# Patient Record
Sex: FEMALE | Race: WHITE | Employment: OTHER | URBAN - METROPOLITAN AREA
[De-identification: names, ages, dates, MRNs, and addresses within clinical notes are randomized per-mention and may not be internally consistent; named-entity substitution may affect disease eponyms.]

---

## 2018-09-11 ENCOUNTER — APPOINTMENT (OUTPATIENT)
Dept: GENERAL RADIOLOGY | Age: 68
End: 2018-09-11
Attending: EMERGENCY MEDICINE
Payer: MEDICARE

## 2018-09-11 ENCOUNTER — HOSPITAL ENCOUNTER (EMERGENCY)
Age: 68
Discharge: HOME | End: 2018-09-11
Attending: EMERGENCY MEDICINE
Payer: MEDICARE

## 2018-09-11 VITALS
BODY MASS INDEX: 28.32 KG/M2 | WEIGHT: 170 LBS | HEIGHT: 65 IN | DIASTOLIC BLOOD PRESSURE: 104 MMHG | HEART RATE: 67 BPM | OXYGEN SATURATION: 99 % | TEMPERATURE: 98.1 F | RESPIRATION RATE: 18 BRPM | SYSTOLIC BLOOD PRESSURE: 192 MMHG

## 2018-09-11 DIAGNOSIS — S63.502A WRIST SPRAIN, LEFT, INITIAL ENCOUNTER: Primary | ICD-10-CM

## 2018-09-11 PROCEDURE — 99283 EMERGENCY DEPT VISIT LOW MDM: CPT | Performed by: EMERGENCY MEDICINE

## 2018-09-11 PROCEDURE — 74011250637 HC RX REV CODE- 250/637: Performed by: EMERGENCY MEDICINE

## 2018-09-11 PROCEDURE — 73110 X-RAY EXAM OF WRIST: CPT

## 2018-09-11 PROCEDURE — 75810000053 HC SPLINT APPLICATION: Performed by: EMERGENCY MEDICINE

## 2018-09-11 PROCEDURE — L3908 WHO COCK-UP NONMOLDE PRE OTS: HCPCS

## 2018-09-11 RX ORDER — MORPHINE SULFATE 15 MG/1
15 TABLET ORAL
Status: COMPLETED | OUTPATIENT
Start: 2018-09-11 | End: 2018-09-11

## 2018-09-11 RX ADMIN — MORPHINE SULFATE 15 MG: 15 TABLET ORAL at 17:44

## 2018-09-11 NOTE — DISCHARGE INSTRUCTIONS
Learning About RICE (Rest, Ice, Compression, and Elevation)  What is RICE? RICE is a way to care for an injury. RICE helps relieve pain and swelling. It may also help with healing and flexibility. RICE stands for:  · Rest and protect the injured or sore area. · Ice or a cold pack used as soon as possible. · Compression, or wrapping the injured or sore area with an elastic bandage. · Elevation (propping up) the injured or sore area. How do you do RICE? You can use RICE for home treatment when you have general aches and pains or after an injury or surgery. Rest  · Do not put weight on the injury for at least 24 to 48 hours. · Use crutches for a badly sprained knee or ankle. · Support a sprained wrist, elbow, or shoulder with a sling. Ice  · Put ice or a cold pack on the injury right away to reduce pain and swelling. Frozen vegetables will also work as an ice pack. Put a thin cloth between the ice or cold pack and your skin. The cloth protects the injured area from getting too cold. · Use ice for 10 to 15 minutes at a time for the first 48 to 72 hours. Compression  · Use compression for sprains, strains, and surgeries of the arms and legs. · Wrap the injured area with an elastic bandage or compression sleeve to reduce swelling. · Don't wrap it too tightly. If the area below it feels numb, tingles, or feels cool, loosen the wrap. Elevation  · Use elevation for areas of the body that can be propped up, such as arms and legs. · Prop up the injured area on pillows whenever you use ice. Keep it propped up anytime you sit or lie down. · Try to keep the injured area at or above the level of your heart. This will help reduce swelling and bruising. Where can you learn more? Go to http://marianela-kana.info/. Enter W362 in the search box to learn more about \"Learning About RICE (Rest, Ice, Compression, and Elevation). \"  Current as of: November 29, 2017  Content Version: 11.7  © 9952-9372 [employer]. Care instructions adapted under license by [employer] (which disclaims liability or warranty for this information). If you have questions about a medical condition or this instruction, always ask your healthcare professional. [address] any warranty or liability for your use of this information. Strain or Sprain: Care Instructions  Your Care Instructions    A strain happens when you overstretch, or pull, a muscle. A sprain occurs when you stretch or tear a ligament, the tough tissue that connects one bone to another. These problems can happen when you exercise or lift something or when you are in an accident. Rest and other home care can help strains and sprains heal.  The doctor has checked you carefully, but problems can develop later. If you notice any problems or new symptoms,  get medical treatment right away. Follow-up care is a key part of your treatment and safety. Be sure to make and go to all appointments, and call your doctor if you are having problems. It's also a good idea to know your test results and keep a list of the medicines you take. How can you care for yourself at home? · If your doctor gave you a sling, splint, brace, or immobilizer, use it exactly as directed. · Rest the strained or sprained area, and follow your doctor's advice about when you can be active again. · Put ice or a cold pack on the sore area for 10 to 20 minutes at a time to stop swelling. Try this every 1 to 2 hours for 3 days (when you are awake) or until the swelling goes down. Put a thin cloth between the ice pack and your skin. Keep your splint or brace dry. · Prop up a sore arm or leg on a pillow when you ice it or anytime you sit or lie down. Try to keep it higher than the level of your heart. This will help reduce swelling. · Take pain medicines exactly as directed.   ¨ If the doctor gave you a prescription medicine for pain, take it as prescribed. ¨ If you are not taking a prescription pain medicine, ask your doctor if you can take an over-the-counter medicine. · Do exercises as directed by your doctor or physical therapist.  · Return to your usual level of activity slowly. · Do not do anything that makes the pain worse. When should you call for help? Call your doctor now or seek immediate medical care if:    · You have severe or increasing pain.     · You have tingling, weakness, or numbness in the area.     · The area turns cold or changes color.     · Your cast or splint feels too tight.     · You have symptoms of a blood clot, such as:  ¨ Pain in your calf, back of the knee, thigh, or groin. ¨ Redness and swelling in your leg or groin.     · You cannot move the strained part of your body.    Watch closely for changes in your health, and be sure to contact your doctor if:    · You do not get better as expected. Where can you learn more? Go to http://marianela-kana.info/. Enter Z900 in the search box to learn more about \"Strain or Sprain: Care Instructions. \"  Current as of: November 29, 2017  Content Version: 11.7  © 0452-5933 [employer]. Care instructions adapted under license by [employer] (which disclaims liability or warranty for this information). If you have questions about a medical condition or this instruction, always ask your healthcare professional. [place] [address] any warranty or liability for your use of this information.

## 2018-09-11 NOTE — ED TRIAGE NOTES
Pt was at home today, lives with family. No family was at home for a short time and when they came back, pt's right arm was causing excruciating pain. Pt can lift and move arm but causes terrible pain. ams baseline. Does not know if she fell. Family denies any medical hx

## 2018-09-11 NOTE — ED NOTES
I have reviewed discharge instructions with the patient and caregiver. The patient and caregiver verbalized understanding. Patient left ED via Discharge Method: ambulatory to Home with family Opportunity for questions and clarification provided. Patient given 0 scripts. To continue your aftercare when you leave the hospital, you may receive an automated call from our care team to check in on how you are doing. This is a free service and part of our promise to provide the best care and service to meet your aftercare needs.  If you have questions, or wish to unsubscribe from this service please call [phone]. Thank you for Choosing our [facility] Emergency Department.

## 2018-09-11 NOTE — ED PROVIDER NOTES
[address] Emergency Department Seen  @ [facility] EMERGENCY DEPT in room ERK/K Chief Complaint Patient presents with  Wrist Pain HPI:  
Vicki De Jesus is a 77 y.o. female who complaints of right wrist pain. Patient was at home alone briefly while the family went out. When he returned she was sitting up in a chair. He denies any fall. Complains of pain in the right wrist.  Mild swelling. May be some deformity. Pain with range of motion. Historian: patient and family Review of Systems: No fever chills chest pain or shortness of breath Past Medical History: 
Primary Care Doctor: None Past Medical History:  
Diagnosis Date  Neurological disorder Seizures No past surgical history on file. Social History Social History  Marital status:  Spouse name: N/A  
 Number of children: N/A  
 Years of education: N/A Social History Main Topics  Smoking status: Never Smoker  Smokeless tobacco: Not on file  Alcohol use Not on file  Drug use: Not on file  Sexual activity: Not on file Other Topics Concern  Not on file Social History Narrative Previous Medications DIVALPROEX DR (DEPAKOTE) 250 MG TABLET    Take 250 mg by mouth three (3) times daily. HYDROCODONE-ACETAMINOPHEN (NORCO) 5-325 MG PER TABLET    Take 1 Tab by mouth every four (4) hours as needed for Pain. Max Daily Amount: 6 Tabs. ONDANSETRON (ZOFRAN ODT) 4 MG DISINTEGRATING TABLET    Take 1 Tab by mouth every eight (8) hours as needed for Nausea. Allergies Allergen Reactions  Amoxicillin Rash Physical Exam:   
Vitals:  
 09/11/18 1653 BP: (!) 192/104 Pulse: 67 Resp: 18 Temp: 98.1 °F (36.7 °C) SpO2: 99% Vital signs were reviewed. Pulse oximetry interpretation: normal 
 
Constitutional: Elderly nontoxic Head: Atraumatic, normo-cephalic,  
Ears/Nose/Throat: throat clear, mucous membranes moist, Eyes: PERRL, EOMI, anicteric, Cardiovascular: regular rate and rhythm, no murmur, 2+ radial pulses, Respiratory: clear to auscultation with no wheezes, rales, ronchi, Musculoskeletal: Right wrist is tender pain with movement Skin: dry, no rashes, Neurologic: alert, oriented, answers questions follows commands,   
 
_____________________________________________________________________ Medical Decision Making: This is a new problem that does need additional workup Labs/Radiographs/ECG were ordered: yes 
 
 
______________________________________________________________________ 
ED Evaluation Radiology studies performed: XR WRIST RT AP/LAT/OBL MIN 3V    (Results Pending) Radiographs were visualized by me No current facility-administered medications for this encounter. Current Outpatient Prescriptions Medication Sig  
 HYDROcodone-acetaminophen (NORCO) 5-325 mg per tablet Take 1 Tab by mouth every four (4) hours as needed for Pain. Max Daily Amount: 6 Tabs.  ondansetron (ZOFRAN ODT) 4 mg disintegrating tablet Take 1 Tab by mouth every eight (8) hours as needed for Nausea.  divalproex DR (DEPAKOTE) 250 mg tablet Take 250 mg by mouth three (3) times daily.  
 
 
================================================================== 
ASSESSMENT: 78-year-old female with right wrist pain. Questionable fall. We'll give her some pain medicine today. Didn't x-ray. Reassess PLAN: likely splint and discharge home Yuki Aleman MD; 9/11/2018 @5:03 PM=========================================== 
 
ED Course

## 2020-11-25 ENCOUNTER — PATIENT OUTREACH (OUTPATIENT)
Dept: CASE MANAGEMENT | Age: 70
End: 2020-11-25

## 2020-11-25 NOTE — PROGRESS NOTES
ROSIE DAVIS tried to call and touch base with pt/family. However, ROSIE DAVIS got their voice mail and had to leave a message. PLAN:  ROSIE DAVIS will attempt to reach pt/family again on Friday if ROSIE DAVIS doesn't hear from them before then.

## 2020-11-27 ENCOUNTER — PATIENT OUTREACH (OUTPATIENT)
Dept: CASE MANAGEMENT | Age: 70
End: 2020-11-27

## 2020-11-27 NOTE — Clinical Note
Good Afternoon,    I just wanted to let you know that I've touched base with this patients son. He stated that for right now he wants to put any help on hold until he's able to talk with all of his sisters about the pateints care needs. I made sure that he knows how to get in touch with me, but if I don't hear from him in three weeks then I'll plan to close this CCM episode. I will let you know in three weeks if I've heard from him.     [address] [facility]

## 2020-11-27 NOTE — PROGRESS NOTES
ROSIE DAVIS called and spoke with pt's son who is an authorized person per her ODALYS on file. Pt's son said that he still needs to talk with his sisters before they make any major decisions about pt's care needs. Pt's son said that for now he'll take ROSIE DAVIS's info and call ROSIE DAVIS back if he feels like they need anything.     PLAN:  ROSIE DAVIS will remain available over the next few weeks but if ROSIE DAVIS doesn't hear back from pt's son ROSIE DAVIS will move to close CCM episode and remove herself from pt's care team.      ROSIE DAVIS will notify provider of pt's families wishes

## 2020-12-14 ENCOUNTER — APPOINTMENT (OUTPATIENT)
Dept: CT IMAGING | Age: 70
DRG: 177 | End: 2020-12-14
Attending: EMERGENCY MEDICINE
Payer: MEDICARE

## 2020-12-14 ENCOUNTER — HOSPITAL ENCOUNTER (INPATIENT)
Age: 70
LOS: 8 days | Discharge: HOME | DRG: 177 | End: 2020-12-22
Attending: EMERGENCY MEDICINE | Admitting: INTERNAL MEDICINE
Payer: MEDICARE

## 2020-12-14 ENCOUNTER — APPOINTMENT (OUTPATIENT)
Dept: GENERAL RADIOLOGY | Age: 70
DRG: 177 | End: 2020-12-14
Attending: EMERGENCY MEDICINE
Payer: MEDICARE

## 2020-12-14 DIAGNOSIS — R41.0 DELIRIUM: Primary | ICD-10-CM

## 2020-12-14 DIAGNOSIS — N39.0 URINARY TRACT INFECTION WITHOUT HEMATURIA, SITE UNSPECIFIED: ICD-10-CM

## 2020-12-14 DIAGNOSIS — R91.8 BILATERAL PULMONARY INFILTRATES ON CHEST X-RAY: ICD-10-CM

## 2020-12-14 PROBLEM — G93.41 ENCEPHALOPATHY, METABOLIC: Status: ACTIVE | Noted: 2020-12-14

## 2020-12-14 PROBLEM — G40.909 SEIZURE DISORDER (HCC): Status: ACTIVE | Noted: 2020-12-14

## 2020-12-14 PROBLEM — Z20.822 EXPOSURE TO COVID-19 VIRUS: Status: ACTIVE | Noted: 2020-12-14

## 2020-12-14 PROBLEM — F03.90 DEMENTIA (HCC): Status: ACTIVE | Noted: 2020-12-14

## 2020-12-14 PROBLEM — R41.82 ALTERED MENTAL STATUS: Status: ACTIVE | Noted: 2020-12-14

## 2020-12-14 LAB
ALBUMIN SERPL-MCNC: 2.9 G/DL (ref 3.2–4.6)
ALBUMIN/GLOB SERPL: 0.6 {RATIO} (ref 1.2–3.5)
ALP SERPL-CCNC: 90 U/L (ref 50–136)
ALT SERPL-CCNC: 16 U/L (ref 12–65)
ANION GAP SERPL CALC-SCNC: 7 MMOL/L (ref 7–16)
APPEARANCE UR: ABNORMAL
AST SERPL-CCNC: 35 U/L (ref 15–37)
ATRIAL RATE: 93 BPM
BACTERIA URNS QL MICRO: 0 /HPF
BASOPHILS # BLD: 0.1 K/UL (ref 0–0.2)
BASOPHILS NFR BLD: 1 % (ref 0–2)
BILIRUB SERPL-MCNC: 0.6 MG/DL (ref 0.2–1.1)
BILIRUB UR QL: ABNORMAL
BUN SERPL-MCNC: 13 MG/DL (ref 8–23)
CALCIUM SERPL-MCNC: 9.2 MG/DL (ref 8.3–10.4)
CALCULATED P AXIS, ECG09: 71 DEGREES
CALCULATED R AXIS, ECG10: 59 DEGREES
CALCULATED T AXIS, ECG11: -36 DEGREES
CASTS URNS QL MICRO: ABNORMAL /LPF
CHLORIDE SERPL-SCNC: 102 MMOL/L (ref 98–107)
CO2 SERPL-SCNC: 29 MMOL/L (ref 21–32)
COLOR UR: ABNORMAL
COVID-19 RAPID TEST, COVR: DETECTED
CREAT SERPL-MCNC: 0.63 MG/DL (ref 0.6–1)
DIAGNOSIS, 93000: NORMAL
DIFFERENTIAL METHOD BLD: ABNORMAL
EOSINOPHIL # BLD: 0.1 K/UL (ref 0–0.8)
EOSINOPHIL NFR BLD: 1 % (ref 0.5–7.8)
EPI CELLS #/AREA URNS HPF: ABNORMAL /HPF
ERYTHROCYTE [DISTWIDTH] IN BLOOD BY AUTOMATED COUNT: 13 % (ref 11.9–14.6)
GLOBULIN SER CALC-MCNC: 4.7 G/DL (ref 2.3–3.5)
GLUCOSE SERPL-MCNC: 90 MG/DL (ref 65–100)
GLUCOSE UR STRIP.AUTO-MCNC: NEGATIVE MG/DL
HCT VFR BLD AUTO: 40.4 % (ref 35.8–46.3)
HGB BLD-MCNC: 13 G/DL (ref 11.7–15.4)
HGB UR QL STRIP: ABNORMAL
IMM GRANULOCYTES # BLD AUTO: 0.1 K/UL (ref 0–0.5)
IMM GRANULOCYTES NFR BLD AUTO: 1 % (ref 0–5)
KETONES UR QL STRIP.AUTO: 15 MG/DL
LACTATE SERPL-SCNC: 1.7 MMOL/L (ref 0.4–2)
LEUKOCYTE ESTERASE UR QL STRIP.AUTO: ABNORMAL
LYMPHOCYTES # BLD: 2.1 K/UL (ref 0.5–4.6)
LYMPHOCYTES NFR BLD: 24 % (ref 13–44)
MCH RBC QN AUTO: 29 PG (ref 26.1–32.9)
MCHC RBC AUTO-ENTMCNC: 32.2 G/DL (ref 31.4–35)
MCV RBC AUTO: 90 FL (ref 79.6–97.8)
MONOCYTES # BLD: 0.7 K/UL (ref 0.1–1.3)
MONOCYTES NFR BLD: 8 % (ref 4–12)
NEUTS SEG # BLD: 5.8 K/UL (ref 1.7–8.2)
NEUTS SEG NFR BLD: 66 % (ref 43–78)
NITRITE UR QL STRIP.AUTO: NEGATIVE
NRBC # BLD: 0 K/UL (ref 0–0.2)
P-R INTERVAL, ECG05: 116 MS
PH UR STRIP: 6 [PH] (ref 5–9)
PLATELET # BLD AUTO: 368 K/UL (ref 150–450)
PMV BLD AUTO: 9.1 FL (ref 9.4–12.3)
POTASSIUM SERPL-SCNC: 3.5 MMOL/L (ref 3.5–5.1)
PROCALCITONIN SERPL-MCNC: <0.05 NG/ML
PROT SERPL-MCNC: 7.6 G/DL (ref 6.3–8.2)
PROT UR STRIP-MCNC: 30 MG/DL
Q-T INTERVAL, ECG07: 332 MS
QRS DURATION, ECG06: 88 MS
QTC CALCULATION (BEZET), ECG08: 412 MS
RBC # BLD AUTO: 4.49 M/UL (ref 4.05–5.2)
RBC #/AREA URNS HPF: ABNORMAL /HPF
SODIUM SERPL-SCNC: 138 MMOL/L (ref 136–145)
SOURCE, COVRS: ABNORMAL
SP GR UR REFRACTOMETRY: 1.02 (ref 1–1.02)
UROBILINOGEN UR QL STRIP.AUTO: 1 EU/DL (ref 0.2–1)
VALPROATE SERPL-MCNC: 64 UG/ML (ref 50–100)
VENTRICULAR RATE, ECG03: 93 BPM
WBC # BLD AUTO: 8.7 K/UL (ref 4.3–11.1)
WBC URNS QL MICRO: >100 /HPF

## 2020-12-14 PROCEDURE — 93005 ELECTROCARDIOGRAM TRACING: CPT | Performed by: EMERGENCY MEDICINE

## 2020-12-14 PROCEDURE — 74011250637 HC RX REV CODE- 250/637: Performed by: INTERNAL MEDICINE

## 2020-12-14 PROCEDURE — 87088 URINE BACTERIA CULTURE: CPT

## 2020-12-14 PROCEDURE — 74011250636 HC RX REV CODE- 250/636: Performed by: EMERGENCY MEDICINE

## 2020-12-14 PROCEDURE — 83605 ASSAY OF LACTIC ACID: CPT

## 2020-12-14 PROCEDURE — 71045 X-RAY EXAM CHEST 1 VIEW: CPT

## 2020-12-14 PROCEDURE — 80053 COMPREHEN METABOLIC PANEL: CPT

## 2020-12-14 PROCEDURE — 96365 THER/PROPH/DIAG IV INF INIT: CPT

## 2020-12-14 PROCEDURE — 87040 BLOOD CULTURE FOR BACTERIA: CPT

## 2020-12-14 PROCEDURE — 85025 COMPLETE CBC W/AUTO DIFF WBC: CPT

## 2020-12-14 PROCEDURE — 87635 SARS-COV-2 COVID-19 AMP PRB: CPT

## 2020-12-14 PROCEDURE — 87186 SC STD MICRODIL/AGAR DIL: CPT

## 2020-12-14 PROCEDURE — 80164 ASSAY DIPROPYLACETIC ACD TOT: CPT

## 2020-12-14 PROCEDURE — 84145 PROCALCITONIN (PCT): CPT

## 2020-12-14 PROCEDURE — 99284 EMERGENCY DEPT VISIT MOD MDM: CPT

## 2020-12-14 PROCEDURE — 65270000029 HC RM PRIVATE

## 2020-12-14 PROCEDURE — 74011000258 HC RX REV CODE- 258: Performed by: EMERGENCY MEDICINE

## 2020-12-14 PROCEDURE — 81001 URINALYSIS AUTO W/SCOPE: CPT

## 2020-12-14 PROCEDURE — 74011250636 HC RX REV CODE- 250/636: Performed by: INTERNAL MEDICINE

## 2020-12-14 PROCEDURE — 87086 URINE CULTURE/COLONY COUNT: CPT

## 2020-12-14 PROCEDURE — 70450 CT HEAD/BRAIN W/O DYE: CPT

## 2020-12-14 RX ORDER — ACETAMINOPHEN 650 MG/1
650 SUPPOSITORY RECTAL
Status: DISCONTINUED | OUTPATIENT
Start: 2020-12-14 | End: 2020-12-22 | Stop reason: HOSPADM

## 2020-12-14 RX ORDER — POLYETHYLENE GLYCOL 3350 17 G/17G
17 POWDER, FOR SOLUTION ORAL DAILY PRN
Status: DISCONTINUED | OUTPATIENT
Start: 2020-12-14 | End: 2020-12-22 | Stop reason: HOSPADM

## 2020-12-14 RX ORDER — SODIUM CHLORIDE 9 MG/ML
50 INJECTION, SOLUTION INTRAVENOUS CONTINUOUS
Status: DISPENSED | OUTPATIENT
Start: 2020-12-14 | End: 2020-12-15

## 2020-12-14 RX ORDER — SODIUM CHLORIDE 0.9 % (FLUSH) 0.9 %
5-40 SYRINGE (ML) INJECTION AS NEEDED
Status: DISCONTINUED | OUTPATIENT
Start: 2020-12-14 | End: 2020-12-22 | Stop reason: HOSPADM

## 2020-12-14 RX ORDER — ENOXAPARIN SODIUM 100 MG/ML
40 INJECTION SUBCUTANEOUS DAILY
Status: DISCONTINUED | OUTPATIENT
Start: 2020-12-15 | End: 2020-12-15

## 2020-12-14 RX ORDER — ACETAMINOPHEN 325 MG/1
650 TABLET ORAL
Status: DISCONTINUED | OUTPATIENT
Start: 2020-12-14 | End: 2020-12-22 | Stop reason: HOSPADM

## 2020-12-14 RX ORDER — DIVALPROEX SODIUM 125 MG/1
250 CAPSULE, COATED PELLETS ORAL 3 TIMES DAILY
Status: DISCONTINUED | OUTPATIENT
Start: 2020-12-14 | End: 2020-12-22 | Stop reason: HOSPADM

## 2020-12-14 RX ORDER — ONDANSETRON 2 MG/ML
4 INJECTION INTRAMUSCULAR; INTRAVENOUS
Status: DISCONTINUED | OUTPATIENT
Start: 2020-12-14 | End: 2020-12-22 | Stop reason: HOSPADM

## 2020-12-14 RX ORDER — SODIUM CHLORIDE 0.9 % (FLUSH) 0.9 %
5-40 SYRINGE (ML) INJECTION EVERY 8 HOURS
Status: DISCONTINUED | OUTPATIENT
Start: 2020-12-14 | End: 2020-12-22 | Stop reason: HOSPADM

## 2020-12-14 RX ORDER — PROMETHAZINE HYDROCHLORIDE 25 MG/1
12.5 TABLET ORAL
Status: DISCONTINUED | OUTPATIENT
Start: 2020-12-14 | End: 2020-12-22 | Stop reason: HOSPADM

## 2020-12-14 RX ADMIN — CEFTRIAXONE SODIUM 1 G: 1 INJECTION, POWDER, FOR SOLUTION INTRAMUSCULAR; INTRAVENOUS at 15:31

## 2020-12-14 RX ADMIN — SODIUM CHLORIDE 1000 ML: 900 INJECTION, SOLUTION INTRAVENOUS at 13:42

## 2020-12-14 RX ADMIN — SODIUM CHLORIDE 50 ML/HR: 900 INJECTION, SOLUTION INTRAVENOUS at 19:03

## 2020-12-14 RX ADMIN — DIVALPROEX SODIUM 250 MG: 125 CAPSULE ORAL at 22:21

## 2020-12-14 NOTE — ED PROVIDER NOTES
70-year-old female was brought in by EMS. Due to dementia and altered mental status, she cannot give any history at all. Son gives history. Patient has a history of seizure disorder takes Depakote. Also history of occasional UTIs as well. She did have some UTI symptoms and was started on Cipro for evidently for UTI about 3 days ago. Son states over the last 24 hours she has had decreased activity with decreased mental status. Some tachypnea and apparent shortness of breath as well. She has had a slight cough but decreased p.o. intake. Her baseline is such that she usually walks with assist and can occasionally carry on simple conversation. However the last 1 to 2days she has not gotten up at all and has eaten very little is really not spoken much at all in the last 2 days. States no history of cardiac issues, lung issues or CVA. Some falls. The history is provided by a caregiver and the EMS personnel. Fatigue   This is a new problem. The current episode started more than 2 days ago. The problem has been rapidly worsening. There was no focality noted. Primary symptoms include slurred speech, mental status change and unresponsiveness. There has been no fever. Associated symptoms include shortness of breath, altered mental status and confusion. Pertinent negatives include no chest pain and no vomiting. Associated medical issues include dementia.         Past Medical History:   Diagnosis Date    Neurological disorder     Seizures       Past Surgical History:   Procedure Laterality Date    HX HYSTERECTOMY           Family History:   Problem Relation Age of Onset    No Known Problems Mother     No Known Problems Father     No Known Problems Maternal Grandmother     No Known Problems Maternal Grandfather     No Known Problems Paternal Grandmother     No Known Problems Paternal Grandfather        Social History     Socioeconomic History    Marital status:      Spouse name: Not on file    Number of children: Not on file    Years of education: Not on file    Highest education level: Not on file   Occupational History    Not on file   Social Needs    Financial resource strain: Not on file    Food insecurity     Worry: Not on file     Inability: Not on file    Transportation needs     Medical: Not on file     Non-medical: Not on file   Tobacco Use    Smoking status: Never Smoker    Smokeless tobacco: Never Used   Substance and Sexual Activity    Alcohol use: Not Currently    Drug use: Never    Sexual activity: Not Currently     Partners: Male   Lifestyle    Physical activity     Days per week: Not on file     Minutes per session: Not on file    Stress: Not on file   Relationships    Social connections     Talks on phone: Not on file     Gets together: Not on file     Attends Jewish service: Not on file     Active member of club or organization: Not on file     Attends meetings of clubs or organizations: Not on file     Relationship status: Not on file    Intimate partner violence     Fear of current or ex partner: Not on file     Emotionally abused: Not on file     Physically abused: Not on file     Forced sexual activity: Not on file   Other Topics Concern    Not on file   Social History Narrative    Not on file         ALLERGIES: Amoxicillin    Review of Systems   Unable to perform ROS: Dementia   Constitutional: Positive for fatigue. Respiratory: Positive for shortness of breath. Cardiovascular: Negative for chest pain. Gastrointestinal: Negative for vomiting. Psychiatric/Behavioral: Positive for confusion. Vitals:    12/14/20 1212   BP: 125/83   Pulse: (!) 107   Resp: 26   Temp: 98 °F (36.7 °C)   SpO2: 96%   Weight: 65.3 kg (144 lb)   Height: 5' 5\" (1.651 m)            Physical Exam  Vitals signs and nursing note reviewed. Constitutional:       Appearance: She is well-developed. She is ill-appearing. HENT:      Head: Normocephalic and atraumatic.       Right Ear: External ear normal.      Left Ear: External ear normal.      Mouth/Throat:      Pharynx: No oropharyngeal exudate. Eyes:      Conjunctiva/sclera: Conjunctivae normal.      Pupils: Pupils are equal, round, and reactive to light. Neck:      Musculoskeletal: Normal range of motion and neck supple. Cardiovascular:      Rate and Rhythm: Normal rate and regular rhythm. Heart sounds: No murmur. Pulmonary:      Effort: Tachypnea present. No accessory muscle usage or respiratory distress. Breath sounds: Normal breath sounds. Abdominal:      General: Bowel sounds are normal.      Palpations: Abdomen is soft. There is no mass. Tenderness: There is no abdominal tenderness. There is no guarding or rebound. Hernia: No hernia is present. Skin:     General: Skin is warm and dry. Neurological:      Mental Status: She is oriented to person, place, and time. She is lethargic. GCS: GCS eye subscore is 2. GCS verbal subscore is 2. GCS motor subscore is 5. Gait: Gait normal.      Comments: Nl speech  Seems to have equal strength in upper extremities regarding  and pulling. No obvious facial asymmetry. Moves both lower extremities. Does not follow commands. Psychiatric:         Speech: Speech normal.          MDM  Number of Diagnoses or Management Options  Diagnosis management comments: Patient with delirium. Assess for dehydration, sepsis, pneumonia, UTI. Check for electrolyte abnormalities. Head CT to assess for any intracranial injuries. Check EKG.        Amount and/or Complexity of Data Reviewed  Clinical lab tests: reviewed and ordered  Tests in the radiology section of CPT®: ordered and reviewed  Tests in the medicine section of CPT®: ordered and reviewed  Review and summarize past medical records: yes (See HPI)  Discuss the patient with other providers: yes  Independent visualization of images, tracings, or specimens: yes    Risk of Complications, Morbidity, and/or Mortality  Presenting problems: moderate  Diagnostic procedures: low  Management options: moderate    Patient Progress  Patient progress: stable         Procedures      Results Include:    Recent Results (from the past 24 hour(s))   CBC WITH AUTOMATED DIFF    Collection Time: 12/14/20 12:14 PM   Result Value Ref Range    WBC 8.7 4.3 - 11.1 K/uL    RBC 4.49 4.05 - 5.2 M/uL    HGB 13.0 11.7 - 15.4 g/dL    HCT 40.4 35.8 - 46.3 %    MCV 90.0 79.6 - 97.8 FL    MCH 29.0 26.1 - 32.9 PG    MCHC 32.2 31.4 - 35.0 g/dL    RDW 13.0 11.9 - 14.6 %    PLATELET 022 913 - 723 K/uL    MPV 9.1 (L) 9.4 - 12.3 FL    ABSOLUTE NRBC 0.00 0.0 - 0.2 K/uL    DF AUTOMATED      NEUTROPHILS 66 43 - 78 %    LYMPHOCYTES 24 13 - 44 %    MONOCYTES 8 4.0 - 12.0 %    EOSINOPHILS 1 0.5 - 7.8 %    BASOPHILS 1 0.0 - 2.0 %    IMMATURE GRANULOCYTES 1 0.0 - 5.0 %    ABS. NEUTROPHILS 5.8 1.7 - 8.2 K/UL    ABS. LYMPHOCYTES 2.1 0.5 - 4.6 K/UL    ABS. MONOCYTES 0.7 0.1 - 1.3 K/UL    ABS. EOSINOPHILS 0.1 0.0 - 0.8 K/UL    ABS. BASOPHILS 0.1 0.0 - 0.2 K/UL    ABS. IMM. GRANS. 0.1 0.0 - 0.5 K/UL   METABOLIC PANEL, COMPREHENSIVE    Collection Time: 12/14/20 12:14 PM   Result Value Ref Range    Sodium 138 136 - 145 mmol/L    Potassium 3.5 3.5 - 5.1 mmol/L    Chloride 102 98 - 107 mmol/L    CO2 29 21 - 32 mmol/L    Anion gap 7 7 - 16 mmol/L    Glucose 90 65 - 100 mg/dL    BUN 13 8 - 23 MG/DL    Creatinine 0.63 0.6 - 1.0 MG/DL    GFR est AA >60 >60 ml/min/1.73m2    GFR est non-AA >60 >60 ml/min/1.73m2    Calcium 9.2 8.3 - 10.4 MG/DL    Bilirubin, total 0.6 0.2 - 1.1 MG/DL    ALT (SGPT) 16 12 - 65 U/L    AST (SGOT) 35 15 - 37 U/L    Alk.  phosphatase 90 50 - 136 U/L    Protein, total 7.6 6.3 - 8.2 g/dL    Albumin 2.9 (L) 3.2 - 4.6 g/dL    Globulin 4.7 (H) 2.3 - 3.5 g/dL    A-G Ratio 0.6 (L) 1.2 - 3.5     LACTIC ACID    Collection Time: 12/14/20 12:14 PM   Result Value Ref Range    Lactic acid 1.7 0.4 - 2.0 MMOL/L   VALPROIC ACID    Collection Time: 12/14/20 12:14 PM Result Value Ref Range    Valproic acid 64 50 - 100 ug/ml   PROCALCITONIN    Collection Time: 12/14/20 12:35 PM   Result Value Ref Range    Procalcitonin <0.05 ng/mL   URINALYSIS W/ RFLX MICROSCOPIC    Collection Time: 12/14/20  1:52 PM   Result Value Ref Range    Color ELBA      Appearance CLOUDY      Specific gravity 1.024 (H) 1.001 - 1.023      pH (UA) 6.0 5.0 - 9.0      Protein 30 (A) NEG mg/dL    Glucose Negative mg/dL    Ketone 15 (A) NEG mg/dL    Bilirubin SMALL (A) NEG      Blood TRACE (A) NEG      Urobilinogen 1.0 0.2 - 1.0 EU/dL    Nitrites Negative NEG      Leukocyte Esterase LARGE (A) NEG      WBC >100 (H) 0 /hpf    RBC 0-3 0 /hpf    Epithelial cells 0-3 0 /hpf    Bacteria 0 0 /hpf    Casts 10-20 0 /lpf     Ct Head Wo Cont    Result Date: 12/14/2020  Title:  CT Head without contrast. Clinical History: The Female patient is 65years old  presenting with symptoms of unResponsive. Technique: Thin slice axial CT images through the brain were obtained. All CT scans at this facility are performed using dose reduction/dose modulation techniques, as appropriate the performed exam, including the following: Automated Exposure Control; Adjustment of the mA and/or kV according to patient size (this includes techniques or standardized protocols for targeted exams where dose is matched to indication/reason for exam); and Use of Iterative Reconstruction Technique. Radiation Exposure Indices: Reference Air Kerma (Ka,r) = 567 mGy-cm Comparison:  None. Findings:  Cerebrum: Age-related cortical involutional changes are seen with sulcal and ventricular prominence. There is normal gray-white matter differentiation. No evidence of intracranial hemorrhage, mass, or other space-occupying lesion is seen. There are no abnormal extra-axial fluid collections. Cerebellum: Involutional changes. CSF spaces: The ventricular system is within normal limits. The basilar cisterns are unremarkable.  Brainstem: No evidence of ischemia, hemorrhage, or mass. Extracranial tissues: Visualized orbits and extracranial soft tissues are unremarkable. Paranasal sinuses/Mastoids: Well-pneumatized and aerated. . Calvarium: Incidental hyperostosis frontalis interna. IMPRESSION: 1. No acute intracranial abnormality. CPT code 84651     Xr Chest Port    Result Date: 12/14/2020  Clinical History: The female patient is a 65year old  presenting with symptoms of cough. Comparison:  none Findings:  Frontal view of the chest was obtained. There is minimal platelike atelectasis in the right midlung field with questionable slight infiltrate at the lung bases, right greater than left. No pleural effusions are demonstrated. The cardiomediastinal silhouette is within normal limits. There are no acute osseous abnormalities. Impression:  1. Questionable minimal infiltrate at lung bases, right greater than left with slight platelike atelectasis in the peripheral right midlung field. CPT code(s) 16238     3:50 PM  Tachypnea has improved. Discussed with son patient still has resistant UTI and bibasilar infiltrates. Will discuss with hospitalist regarding admission. Will need to be swabbed for Covid.

## 2020-12-14 NOTE — ED NOTES
Pt arrives via EMS from home, lives in a camper in her sons backyard. EMS reports increased urinary frequency for a couple of days. Pt does suffer from dementia, no family currently with patient. Nonverbal in triage NAD. Masked.

## 2020-12-14 NOTE — ED TRIAGE NOTES
Pt arrives via EMS from home, lives in a camper in her sons backyard. States increased urinary frequency and fatigue for a couple of days. Pt does suffer from dementia, no family currently with patient. Alert and oriented to self and place. NAD. Masked.      Denies sick contacts

## 2020-12-14 NOTE — H&P
Hospitalist H&P Note     Admit Date:  2020  1:24 PM   Name:  Raymond Ma  Age: 65 y.o.  : 1950   MRN:  [id number]   PCP:  Trupti Davila PA-C    HPI/Subjective:   Raymond Ma is a 65 y.o. female with medical history significant for dementia, seizures who was brought in by her son due to decreased mental status labored breathing and decreased p.o. intake. Patient is somnolent and is unable to answer any questions. Her son who is at bedside provided the medical history. Patient normally is ANO x1-2 and able to engage in simple conversation. She is able to walk with assistance. However, in the past 3 days patient has become less active and lethargic with decreased p.o. intake. Per son, this is how she normally gets when she gets UTI. Patient was started on ciprofloxacin when she got 2 doses so far. Patient condition worsened and she has not been able to get up or speak much in the past 2 days. He also noticed that patient was tachypneic and labored breathing over the weekend which got worse last night. Of note patient has a family member who tested positive for Covid last Monday. She does not have any known fevers, chills, nausea, vomiting, abdominal pain or diarrhea. In the ED, patient is afebrile and hemodynamically stable with saturation above 94% on room air. Blood work work-up is unrevealing. Urine analysis shows cloudy urine with large leukocyte esterase and more than 100 WBC. CXR with minimal infiltrate at lung bases, right greater than left. Procalc of < 0.05. CT head without any acute intracranial abnormalities. ROS unable to be obtained due to patient's clinical status.       Past Medical History:   Diagnosis Date    Neurological disorder     Seizures      Past Surgical History:   Procedure Laterality Date    HX HYSTERECTOMY        Allergies   Allergen Reactions    Amoxicillin Rash      Social History     Tobacco Use    Smoking status: Never Smoker    Smokeless tobacco: Never Used   Substance Use Topics    Alcohol use: Not Currently      Family History   Problem Relation Age of Onset    No Known Problems Mother     No Known Problems Father     No Known Problems Maternal Grandmother     No Known Problems Maternal Grandfather     No Known Problems Paternal Grandmother     No Known Problems Paternal Grandfather       Immunization History   Administered Date(s) Administered    Influenza Vaccine (Tri) Adjuvanted (>65 Yrs FLUAD TRI 84512) 01/31/2020     PTA Medications:  Prior to Admission Medications   Prescriptions Last Dose Informant Patient Reported? Taking? cholecalciferol (VITAMIN D3) (1000 Units /25 mcg) tablet   Yes No   Sig: Take  by mouth daily. ciprofloxacin HCl (Cipro) 500 mg tablet   No No   Sig: Take 1 Tab by mouth two (2) times a day for 7 days. divalproex (Depakote Sprinkles) 125 mg capsule   No No   Sig: Take 2 Caps by mouth three (3) times daily. Indications: seizures      Facility-Administered Medications: None       Objective:     Patient Vitals for the past 24 hrs:   Temp Pulse Resp BP SpO2   12/14/20 1212 98 °F (36.7 °C) (!) 107 26 125/83 96 %     Oxygen Therapy  O2 Sat (%): 96 % (12/14/20 1212)  O2 Device: Room air (12/14/20 1212)    Estimated body mass index is 23.96 kg/m² as calculated from the following:    Height as of this encounter: 5' 5\" (1.651 m). Weight as of this encounter: 65.3 kg (144 lb). No intake or output data in the 24 hours ending 12/14/20 1644    *Note that automatically entered I/Os may not be accurate; dependent on patient compliance with collection and accurate  by assistants. Physical Exam:  General:     Ill-appearing, somnolent  Head:   normocephalic, atraumatic  Eyes, Ears, nose: PERRL,. Normal Conjunctiva. Neck:    supple, non-tender. Trachea midline. Lungs:   CTAB, no wheezing, rhonchi, rales  Cardiac:   RRR, Normal S1 and S2.    Abdomen:   Soft, non distended, nontender, +BS  Extremities: Warm, dry. No edema  Skin:   No rashes, no jaundice  Neuro: To assess due to status status  Psychiatric:  No anxiety, calm, cooperative      Data Review:   Recent Results (from the past 24 hour(s))   CBC WITH AUTOMATED DIFF    Collection Time: 12/14/20 12:14 PM   Result Value Ref Range    WBC 8.7 4.3 - 11.1 K/uL    RBC 4.49 4.05 - 5.2 M/uL    HGB 13.0 11.7 - 15.4 g/dL    HCT 40.4 35.8 - 46.3 %    MCV 90.0 79.6 - 97.8 FL    MCH 29.0 26.1 - 32.9 PG    MCHC 32.2 31.4 - 35.0 g/dL    RDW 13.0 11.9 - 14.6 %    PLATELET 319 923 - 601 K/uL    MPV 9.1 (L) 9.4 - 12.3 FL    ABSOLUTE NRBC 0.00 0.0 - 0.2 K/uL    DF AUTOMATED      NEUTROPHILS 66 43 - 78 %    LYMPHOCYTES 24 13 - 44 %    MONOCYTES 8 4.0 - 12.0 %    EOSINOPHILS 1 0.5 - 7.8 %    BASOPHILS 1 0.0 - 2.0 %    IMMATURE GRANULOCYTES 1 0.0 - 5.0 %    ABS. NEUTROPHILS 5.8 1.7 - 8.2 K/UL    ABS. LYMPHOCYTES 2.1 0.5 - 4.6 K/UL    ABS. MONOCYTES 0.7 0.1 - 1.3 K/UL    ABS. EOSINOPHILS 0.1 0.0 - 0.8 K/UL    ABS. BASOPHILS 0.1 0.0 - 0.2 K/UL    ABS. IMM. GRANS. 0.1 0.0 - 0.5 K/UL   METABOLIC PANEL, COMPREHENSIVE    Collection Time: 12/14/20 12:14 PM   Result Value Ref Range    Sodium 138 136 - 145 mmol/L    Potassium 3.5 3.5 - 5.1 mmol/L    Chloride 102 98 - 107 mmol/L    CO2 29 21 - 32 mmol/L    Anion gap 7 7 - 16 mmol/L    Glucose 90 65 - 100 mg/dL    BUN 13 8 - 23 MG/DL    Creatinine 0.63 0.6 - 1.0 MG/DL    GFR est AA >60 >60 ml/min/1.73m2    GFR est non-AA >60 >60 ml/min/1.73m2    Calcium 9.2 8.3 - 10.4 MG/DL    Bilirubin, total 0.6 0.2 - 1.1 MG/DL    ALT (SGPT) 16 12 - 65 U/L    AST (SGOT) 35 15 - 37 U/L    Alk.  phosphatase 90 50 - 136 U/L    Protein, total 7.6 6.3 - 8.2 g/dL    Albumin 2.9 (L) 3.2 - 4.6 g/dL    Globulin 4.7 (H) 2.3 - 3.5 g/dL    A-G Ratio 0.6 (L) 1.2 - 3.5     LACTIC ACID    Collection Time: 12/14/20 12:14 PM   Result Value Ref Range    Lactic acid 1.7 0.4 - 2.0 MMOL/L   VALPROIC ACID    Collection Time: 12/14/20 12:14 PM   Result Value Ref Range Valproic acid 64 50 - 100 ug/ml   PROCALCITONIN    Collection Time: 12/14/20 12:35 PM   Result Value Ref Range    Procalcitonin <0.05 ng/mL   URINALYSIS W/ RFLX MICROSCOPIC    Collection Time: 12/14/20  1:52 PM   Result Value Ref Range    Color ELBA      Appearance CLOUDY      Specific gravity 1.024 (H) 1.001 - 1.023      pH (UA) 6.0 5.0 - 9.0      Protein 30 (A) NEG mg/dL    Glucose Negative mg/dL    Ketone 15 (A) NEG mg/dL    Bilirubin SMALL (A) NEG      Blood TRACE (A) NEG      Urobilinogen 1.0 0.2 - 1.0 EU/dL    Nitrites Negative NEG      Leukocyte Esterase LARGE (A) NEG      WBC >100 (H) 0 /hpf    RBC 0-3 0 /hpf    Epithelial cells 0-3 0 /hpf    Bacteria 0 0 /hpf    Casts 10-20 0 /lpf   EKG, 12 LEAD, INITIAL    Collection Time: 12/14/20  1:52 PM   Result Value Ref Range    Ventricular Rate 93 BPM    Atrial Rate 93 BPM    P-R Interval 116 ms    QRS Duration 88 ms    Q-T Interval 332 ms    QTC Calculation (Bezet) 412 ms    Calculated P Axis 71 degrees    Calculated R Axis 59 degrees    Calculated T Axis -36 degrees    Diagnosis       !! AGE AND GENDER SPECIFIC ECG ANALYSIS !!   Normal sinus rhythm  Cannot rule out Anterior infarct , age undetermined  Abnormal ECG  No previous ECGs available         All Micro Results     Procedure Component Value Units Date/Time    CULTURE, URINE [[id number]] Collected:  12/14/20 1352    Order Status:  Completed Specimen:  Urine from Clean catch Updated:  12/14/20 1619    CULTURE, BLOOD [[id number]] Collected:  12/14/20 1235    Order Status:  Completed Specimen:  Blood Updated:  12/14/20 1553    CULTURE, BLOOD [[id number]]     Order Status:  Sent Specimen:  Blood           Current Facility-Administered Medications   Medication Dose Route Frequency    sodium chloride (NS) flush 5-40 mL  5-40 mL IntraVENous Q8H    sodium chloride (NS) flush 5-40 mL  5-40 mL IntraVENous PRN    acetaminophen (TYLENOL) tablet 650 mg  650 mg Oral Q6H PRN    Or    acetaminophen (TYLENOL) suppository 650 mg  650 mg Rectal Q6H PRN    polyethylene glycol (MIRALAX) packet 17 g  17 g Oral DAILY PRN    promethazine (PHENERGAN) tablet 12.5 mg  12.5 mg Oral Q6H PRN    Or    ondansetron (ZOFRAN) injection 4 mg  4 mg IntraVENous Q6H PRN    [START ON 12/15/2020] enoxaparin (LOVENOX) injection 40 mg  40 mg SubCUTAneous DAILY    divalproex (DEPAKOTE SPRINKLE) capsule 250 mg  250 mg Oral TID    0.9% sodium chloride infusion  50 mL/hr IntraVENous CONTINUOUS    [START ON 12/15/2020] cefTRIAXone (ROCEPHIN) 1 g in 0.9% sodium chloride (MBP/ADV) 50 mL MBP  1 g IntraVENous Q24H     Current Outpatient Medications   Medication Sig    ciprofloxacin HCl (Cipro) 500 mg tablet Take 1 Tab by mouth two (2) times a day for 7 days.  divalproex (Depakote Sprinkles) 125 mg capsule Take 2 Caps by mouth three (3) times daily. Indications: seizures    cholecalciferol (VITAMIN D3) (1000 Units /25 mcg) tablet Take  by mouth daily. Other Studies:  Ct Head Wo Cont    Result Date: 12/14/2020  Title:  CT Head without contrast. Clinical History: The Female patient is 65years old  presenting with symptoms of unResponsive. Technique: Thin slice axial CT images through the brain were obtained. All CT scans at this facility are performed using dose reduction/dose modulation techniques, as appropriate the performed exam, including the following: Automated Exposure Control; Adjustment of the mA and/or kV according to patient size (this includes techniques or standardized protocols for targeted exams where dose is matched to indication/reason for exam); and Use of Iterative Reconstruction Technique. Radiation Exposure Indices: Reference Air Kerma (Ka,r) = 567 mGy-cm Comparison:  None. Findings:  Cerebrum: Age-related cortical involutional changes are seen with sulcal and ventricular prominence. There is normal gray-white matter differentiation. No evidence of intracranial hemorrhage, mass, or other space-occupying lesion is seen.  There are no abnormal extra-axial fluid collections. Cerebellum: Involutional changes. CSF spaces: The ventricular system is within normal limits. The basilar cisterns are unremarkable. Brainstem: No evidence of ischemia, hemorrhage, or mass. Extracranial tissues: Visualized orbits and extracranial soft tissues are unremarkable. Paranasal sinuses/Mastoids: Well-pneumatized and aerated. . Calvarium: Incidental hyperostosis frontalis interna. IMPRESSION: 1. No acute intracranial abnormality. CPT code 83329     Xr Chest Port    Result Date: 12/14/2020  Clinical History: The female patient is a 65year old  presenting with symptoms of cough. Comparison:  none Findings:  Frontal view of the chest was obtained. There is minimal platelike atelectasis in the right midlung field with questionable slight infiltrate at the lung bases, right greater than left. No pleural effusions are demonstrated. The cardiomediastinal silhouette is within normal limits. There are no acute osseous abnormalities. Impression:  1. Questionable minimal infiltrate at lung bases, right greater than left with slight platelike atelectasis in the peripheral right midlung field. CPT code(s) 09003       Assessment:     Active Hospital Problems    Diagnosis Date Noted    Urinary tract infection 12/14/2020    Seizure disorder ([facility] [address] 12/14/2020    Dementia ([facility] [address] 12/14/2020    Exposure to COVID-19 virus 12/14/2020    Encephalopathy, metabolic 70/21/3810       Plan:       Acute metabolic encephalopathy likely due to underlying infection  Urinary tract infection  UA consistent with leukocyte esterase and more than 100 WBC. Patient was treated with outpatient Cipro x2 days. CT head without any intracranial abnormalities  - ceftriaxone ( 12/14 - ) pending blood culture and urine culture  - gentle hydration for now. Dementia   At baseline, patient is able to have simple conversation intermittently.   AAO x1-2 at best percent.  - NPO for now  - SLP eval    COVID-19 exposure  Currently on room air without any desaturations. Breathing appears normal.  -Rule out Covid    Seizure disorder: Continue with home Depakote. Diet:  DIET NPO  DVT PPx: lovenox  Code: Full Code; per son, patient does have a living will but he is unsure of her CODE STATUS. He will check in with his sister who is POA (Jes Franklin). Estimated LOS:  Greater than 2 midnights    Labs/Imaging Reviewed. Risk:  HIGH risk due to current condition and comorbid conditions as well as requiring frequent monitoring and high risk of decline. Plan discussed with staff, patient/family and are in agreement. Part of this note was written by using a voice dictation software and the note has been proof read but may still contain some grammatical/other typographical errors.      Signed:  Malcolm Holland MD

## 2020-12-15 PROBLEM — U07.1 COVID-19: Status: ACTIVE | Noted: 2020-12-15

## 2020-12-15 LAB
25(OH)D3 SERPL-MCNC: 49.4 NG/ML (ref 30–100)
ANION GAP SERPL CALC-SCNC: 6 MMOL/L (ref 7–16)
BUN SERPL-MCNC: 10 MG/DL (ref 8–23)
CALCIUM SERPL-MCNC: 8.3 MG/DL (ref 8.3–10.4)
CHLORIDE SERPL-SCNC: 110 MMOL/L (ref 98–107)
CO2 SERPL-SCNC: 28 MMOL/L (ref 21–32)
CREAT SERPL-MCNC: 0.44 MG/DL (ref 0.6–1)
ERYTHROCYTE [DISTWIDTH] IN BLOOD BY AUTOMATED COUNT: 13 % (ref 11.9–14.6)
GLUCOSE SERPL-MCNC: 72 MG/DL (ref 65–100)
HCT VFR BLD AUTO: 33.9 % (ref 35.8–46.3)
HGB BLD-MCNC: 11.1 G/DL (ref 11.7–15.4)
MCH RBC QN AUTO: 29.8 PG (ref 26.1–32.9)
MCHC RBC AUTO-ENTMCNC: 32.7 G/DL (ref 31.4–35)
MCV RBC AUTO: 90.9 FL (ref 79.6–97.8)
NRBC # BLD: 0 K/UL (ref 0–0.2)
PLATELET # BLD AUTO: 300 K/UL (ref 150–450)
PMV BLD AUTO: 9.2 FL (ref 9.4–12.3)
POTASSIUM SERPL-SCNC: 2.8 MMOL/L (ref 3.5–5.1)
RBC # BLD AUTO: 3.73 M/UL (ref 4.05–5.2)
SODIUM SERPL-SCNC: 144 MMOL/L (ref 136–145)
WBC # BLD AUTO: 4.9 K/UL (ref 4.3–11.1)

## 2020-12-15 PROCEDURE — 74011250637 HC RX REV CODE- 250/637: Performed by: INTERNAL MEDICINE

## 2020-12-15 PROCEDURE — 74011250636 HC RX REV CODE- 250/636: Performed by: INTERNAL MEDICINE

## 2020-12-15 PROCEDURE — 74011000258 HC RX REV CODE- 258: Performed by: INTERNAL MEDICINE

## 2020-12-15 PROCEDURE — XW033E5 INTRODUCTION OF REMDESIVIR ANTI-INFECTIVE INTO PERIPHERAL VEIN, PERCUTANEOUS APPROACH, NEW TECHNOLOGY GROUP 5: ICD-10-PCS | Performed by: INTERNAL MEDICINE

## 2020-12-15 PROCEDURE — 65270000029 HC RM PRIVATE

## 2020-12-15 PROCEDURE — 82306 VITAMIN D 25 HYDROXY: CPT

## 2020-12-15 PROCEDURE — 36415 COLL VENOUS BLD VENIPUNCTURE: CPT

## 2020-12-15 PROCEDURE — 85027 COMPLETE CBC AUTOMATED: CPT

## 2020-12-15 PROCEDURE — 92610 EVALUATE SWALLOWING FUNCTION: CPT

## 2020-12-15 PROCEDURE — 87040 BLOOD CULTURE FOR BACTERIA: CPT

## 2020-12-15 PROCEDURE — 74011000250 HC RX REV CODE- 250: Performed by: INTERNAL MEDICINE

## 2020-12-15 PROCEDURE — 80048 BASIC METABOLIC PNL TOTAL CA: CPT

## 2020-12-15 RX ORDER — MELATONIN
2000 DAILY
Status: DISCONTINUED | OUTPATIENT
Start: 2020-12-15 | End: 2020-12-22 | Stop reason: HOSPADM

## 2020-12-15 RX ORDER — DEXAMETHASONE 4 MG/1
6 TABLET ORAL DAILY
Status: DISCONTINUED | OUTPATIENT
Start: 2020-12-15 | End: 2020-12-22 | Stop reason: HOSPADM

## 2020-12-15 RX ORDER — SODIUM CHLORIDE 9 MG/ML
250 INJECTION, SOLUTION INTRAVENOUS AS NEEDED
Status: CANCELLED | OUTPATIENT
Start: 2020-12-15

## 2020-12-15 RX ORDER — SODIUM CHLORIDE 9 MG/ML
30 INJECTION, SOLUTION INTRAVENOUS EVERY 24 HOURS
Status: DISPENSED | OUTPATIENT
Start: 2020-12-15 | End: 2020-12-20

## 2020-12-15 RX ORDER — ENOXAPARIN SODIUM 100 MG/ML
30 INJECTION SUBCUTANEOUS EVERY 12 HOURS
Status: DISCONTINUED | OUTPATIENT
Start: 2020-12-15 | End: 2020-12-22 | Stop reason: HOSPADM

## 2020-12-15 RX ADMIN — ENOXAPARIN SODIUM 30 MG: 30 INJECTION SUBCUTANEOUS at 09:43

## 2020-12-15 RX ADMIN — ENOXAPARIN SODIUM 30 MG: 30 INJECTION SUBCUTANEOUS at 20:17

## 2020-12-15 RX ADMIN — CEFTRIAXONE SODIUM 1 G: 1 INJECTION, POWDER, FOR SOLUTION INTRAMUSCULAR; INTRAVENOUS at 15:15

## 2020-12-15 RX ADMIN — VITAMIN D, TAB 1000IU (100/BT) 2 TABLET: 25 TAB at 09:43

## 2020-12-15 RX ADMIN — DIVALPROEX SODIUM 250 MG: 125 CAPSULE ORAL at 21:33

## 2020-12-15 RX ADMIN — SODIUM CHLORIDE 30 ML: 900 INJECTION, SOLUTION INTRAVENOUS at 10:45

## 2020-12-15 RX ADMIN — Medication 10 ML: at 15:16

## 2020-12-15 RX ADMIN — DIVALPROEX SODIUM 250 MG: 125 CAPSULE ORAL at 09:43

## 2020-12-15 RX ADMIN — Medication 10 ML: at 04:51

## 2020-12-15 RX ADMIN — DEXAMETHASONE 6 MG: 4 TABLET ORAL at 09:44

## 2020-12-15 RX ADMIN — DIVALPROEX SODIUM 250 MG: 125 CAPSULE ORAL at 15:15

## 2020-12-15 RX ADMIN — REMDESIVIR 200 MG: 100 INJECTION, POWDER, LYOPHILIZED, FOR SOLUTION INTRAVENOUS at 10:43

## 2020-12-15 RX ADMIN — Medication 10 ML: at 21:33

## 2020-12-15 NOTE — PROGRESS NOTES
Pt laying in bed, pt will respond to verbal stimuli by opening her eyes but she makes no verbal intent. She does take her medications with no difficulty. Purewick in place. NS at 50mL to right wrist IV. Call light within reach. Will monitor pt closely.

## 2020-12-15 NOTE — PROGRESS NOTES
LTG: Patient will tolerate least restrictive diet without overt signs or symptoms of airway compromise. STG: Patient will tolerate pureed diet and thin liquids without overt signs or symptoms of airway compromise. STG: patient will participate in ongoing po trials in efforts to advance diet. STG: Patient will participate in modified barium swallow study as clinically indicated. SPEECH LANGUAGE PATHOLOGY: DYSPHAGIA- Initial Assessment    NAME/AGE/GENDER: Emiliano Khan is a 65 y.o. female  DATE: 12/15/2020  PRIMARY DIAGNOSIS: Urinary tract infection [N39.0]  Altered mental status [R41.82]      ICD-10: Treatment Diagnosis: R13.12 Dysphagia, Oropharyngeal Phase    RECOMMENDATIONS   DIET:    PO:  Pureed   Liquids:  regular thin    MEDICATIONS: Crushed in puree     ASPIRATION PRECAUTIONS  · Slow rate of intake  · Small bites/sips  · Upright at 90 degrees during meal     COMPENSATORY STRATEGIES/MODIFICATIONS  · 1:1 assistance  · Small bites/sips  · Ensure fully awake/activity participating     RECOMMENDATIONS for CONTINUED SPEECH THERAPY:   YES: Anticipate need for ongoing speech therapy during this hospitalization and at next level of care. ASSESSMENT   Patient presents with moderate oral dysphagia and no overt s/sx pharyngeal dysphagia. Recommend pureed diet and thin liquids. 1:1 assistance. Will follow up for tolerance and ongoing trials. CONTINUATION OF SKILLED SERVICES/MEDICAL NECESSITY:   Patient is expected to demonstrate progress in  swallow strength, swallow timeliness, swallow function, diet tolerance and swallow safety in order to  improve swallow safety, work toward diet advancement and decrease aspiration risk.  Patient continues to require skilled intervention due to dysphagia.    EDUCATION:  · Recommendations discussed with Patient and RN  REHABILITATION POTENTIAL FOR STATED GOALS: Excellent    PLAN    FREQUENCY/DURATION: Continue to follow patient 2 times a week for duration of hospital stay to address above goals. - Recommendations for next treatment session: Next treatment will address diet tolerance/trials    SUBJECTIVE   Non verbal. Occasional non intelligible mumbling. Eyes open, tracks but somnolent    Oxygen Device: room air. Pain: Pain Scale 1: FLACC  Pain Intensity 1: 0    History of Present Injury/Illness: Ms. Rivas Hdz  has a past medical history of Neurological disorder. . She also  has a past surgical history that includes hx hysterectomy. PRECAUTIONS/ALLERGIES: Amoxicillin     Problem List:  (Impairments causing functional limitations):  1. Oral dysphagia    Previous Dysphagia: NONE REPORTED  Diet Prior to Evaluation: NPO    Orientation:  did not respond    Cognitive-Linguistic Screening: patient did not follow commands or attempt to answer questions. History of dementia, but mental status worse than baseline at this time per chart and nursing report. OBJECTIVE   Oral Motor: did not follow commands. Swallow evaluation:   Patient consumed trials of ice chips, thin liquids via tsp, cup, straw, and puree. Chewables deferred due to mentation and oral delay. Slightly increased transit time, but functional with liquids and puree. Increased oral delay towards end of session after consuming after 3/4 container of puree and patient turning head away from straw after ~5oz. No overt s/sx airway compromise. Unable to assess vocal quality. Single swallow per bite/sip.     Tool Used: Dysphagia Outcome and Severity Scale (JOANNA)    Score Comments   Normal Diet  [] 7 With no strategies or extra time needed   Functional Swallow  [] 6 May have mild oral or pharyngeal delay   Mild Dysphagia  [] 5 Which may require one diet consistency restricted    Mild-Moderate Dysphagia  [] 4 With 1-2 diet consistencies restricted   Moderate Dysphagia  [] 3 With 2 or more diet consistencies restricted   Moderate-Severe Dysphagia  [] 2 With partial PO strategies (trials with ST only)   Severe Dysphagia [] 1 With inability to tolerate any PO safely      Score:  Initial:3 Most Recent: x (Date 12/15/20 )   Interpretation of Tool: The Dysphagia Outcome and Severity Scale (JOANNA) is a simple, easy-to-use, 7-point scale developed to systematically rate the functional severity of dysphagia based on objective assessment and make recommendations for diet level, independence level, and type of nutrition. Current Medications:   No current facility-administered medications on file prior to encounter. Current Outpatient Medications on File Prior to Encounter   Medication Sig Dispense Refill    ciprofloxacin HCl (Cipro) 500 mg tablet Take 1 Tab by mouth two (2) times a day for 7 days. 14 Tab 0    divalproex (Depakote Sprinkles) 125 mg capsule Take 2 Caps by mouth three (3) times daily. Indications: seizures 180 Cap 5    cholecalciferol (VITAMIN D3) (1000 Units /25 mcg) tablet Take  by mouth daily.           INTERDISCIPLINARY COLLABORATION: RN    After treatment position/precautions:  · Upright in bed  · RN notified    Total Treatment Duration:   Time In: 0096  Time Out: [address] [facility] CCC-SLP

## 2020-12-15 NOTE — PROGRESS NOTES
Unable to complete admission database due to pt being unable to answer any question because of mental status at this time.

## 2020-12-15 NOTE — PROGRESS NOTES
Pt in bed. She will turn herself to the side. Pt still making no verbal intent. Pt fed lunch and dinner tolerated both well. Pt took her pills in applesauce without any difficulties. Safety measures in place. Preparing to give report to oncoming shift.

## 2020-12-15 NOTE — PROGRESS NOTES
Hourly rounds performed. All needs met. Bed is in low position and call light is within reach. Pt resting quietly at this time. Will continue to monitor pt and report to oncoming nurse.

## 2020-12-15 NOTE — PROGRESS NOTES
Hospitalist Progress Note     Admit Date:  2020  1:24 PM   Name:  Morris Mcgarry   Age:  65 y.o.  :  1950   MRN:  [id number]   PCP:  Fletcher Tucker PA-C  Treatment Team: Attending Provider: Nicki Spence MD; Primary Nurse: Merlin Rincon; Speech Language Pathologist: Arpan Walton Utilization Review: Lyle Bee RN    Subjective:   As per prior documentation:    Kristy Christine Baez is a 65 y.o. female with medical history significant for dementia, seizures who was brought in by her son due to decreased mental status labored breathing and decreased p.o. intake. Patient is somnolent and is unable to answer any questions. Her son who is at bedside provided the medical history. Patient normally is ANO x1-2 and able to engage in simple conversation. She is able to walk with assistance. However, in the past 3 days patient has become less active and lethargic with decreased p.o. intake. Per son, this is how she normally gets when she gets UTI. Patient was started on ciprofloxacin when she got 2 doses so far. Patient condition worsened and she has not been able to get up or speak much in the past 2 days. He also noticed that patient was tachypneic and labored breathing over the weekend which got worse last night. Of note patient has a family member who tested positive for Covid last Monday. She does not have any known fevers, chills, nausea, vomiting, abdominal pain or diarrhea.     In the ED, patient is afebrile and hemodynamically stable with saturation above 94% on room air. Blood work work-up is unrevealing. Urine analysis shows cloudy urine with large leukocyte esterase and more than 100 WBC. CXR with minimal infiltrate at lung bases, right greater than left. Procalc of < 0.05. CT head without any acute intracranial abnormalities.     ROS unable to be obtained due to patient's clinical status. \"       December 15, 2020patient seen and evaluated.   Resting comfortably and not very interactive or cooperative. Hard of hearing but eventually smiles for me after repeatedly being asked. She is unable to give a history secondary to her underlying dementia. She did test positive for COVID-19. Objective:     Patient Vitals for the past 24 hrs:   Temp Pulse Resp BP SpO2   12/15/20 0701 98.3 °F (36.8 °C) 78 17 (!) 108/58 96 %   12/15/20 0240 97.9 °F (36.6 °C) 76 18 124/65 96 %   12/15/20 0211     96 %   12/15/20 0043    136/67 97 %   12/14/20 2344    (!) 145/72 97 %   12/14/20 2314 97.6 °F (36.4 °C)       12/14/20 2220    130/63 98 %   12/14/20 1947     97 %   12/14/20 1946     97 %   12/14/20 1945     98 %   12/14/20 1944    (!) 144/85    12/14/20 1910    (!) 112/57 97 %   12/14/20 1900    (!) 112/57 100 %   12/14/20 1850    130/60 97 %   12/14/20 1827  74 25 (!) 110/56 97 %   12/14/20 1757  78 18 (!) 114/57 97 %   12/14/20 1727 99.2 °F (37.3 °C) 79 22 117/65 99 %   12/14/20 1657  80 22 (!) 112/55 95 %   12/14/20 1627  82 29 104/70 96 %   12/14/20 1557  79 25 113/66 98 %   12/14/20 1532  77 22 118/62 98 %   12/14/20 1212 98 °F (36.7 °C) (!) 107 26 125/83 96 %     Oxygen Therapy  O2 Sat (%): 96 % (12/15/20 0701)  Pulse via Oximetry: 67 beats per minute (12/15/20 0211)  O2 Device: Room air (12/14/20 1212)  No intake or output data in the 24 hours ending 12/15/20 0749      General:    Thin frail and resting but easily arousable alert. CV:   RRR. No murmur, rub, or gallop. Lungs:   CTAB. No wheezing, rhonchi, or rales. Abdomen:   Soft, nontender, nondistended. Extremities: Warm and dry. No cyanosis or edema. Skin:     No rashes or jaundice. Data Review:  I have reviewed all labs, meds, telemetry events, and studies from the last 24 hours.     Recent Results (from the past 24 hour(s))   CBC WITH AUTOMATED DIFF    Collection Time: 12/14/20 12:14 PM   Result Value Ref Range    WBC 8.7 4.3 - 11.1 K/uL    RBC 4.49 4.05 - 5.2 M/uL    HGB 13.0 11.7 - 15.4 g/dL    HCT 40.4 35.8 - 46.3 %    MCV 90.0 79.6 - 97.8 FL    MCH 29.0 26.1 - 32.9 PG    MCHC 32.2 31.4 - 35.0 g/dL    RDW 13.0 11.9 - 14.6 %    PLATELET 583 229 - 299 K/uL    MPV 9.1 (L) 9.4 - 12.3 FL    ABSOLUTE NRBC 0.00 0.0 - 0.2 K/uL    DF AUTOMATED      NEUTROPHILS 66 43 - 78 %    LYMPHOCYTES 24 13 - 44 %    MONOCYTES 8 4.0 - 12.0 %    EOSINOPHILS 1 0.5 - 7.8 %    BASOPHILS 1 0.0 - 2.0 %    IMMATURE GRANULOCYTES 1 0.0 - 5.0 %    ABS. NEUTROPHILS 5.8 1.7 - 8.2 K/UL    ABS. LYMPHOCYTES 2.1 0.5 - 4.6 K/UL    ABS. MONOCYTES 0.7 0.1 - 1.3 K/UL    ABS. EOSINOPHILS 0.1 0.0 - 0.8 K/UL    ABS. BASOPHILS 0.1 0.0 - 0.2 K/UL    ABS. IMM. GRANS. 0.1 0.0 - 0.5 K/UL   METABOLIC PANEL, COMPREHENSIVE    Collection Time: 12/14/20 12:14 PM   Result Value Ref Range    Sodium 138 136 - 145 mmol/L    Potassium 3.5 3.5 - 5.1 mmol/L    Chloride 102 98 - 107 mmol/L    CO2 29 21 - 32 mmol/L    Anion gap 7 7 - 16 mmol/L    Glucose 90 65 - 100 mg/dL    BUN 13 8 - 23 MG/DL    Creatinine 0.63 0.6 - 1.0 MG/DL    GFR est AA >60 >60 ml/min/1.73m2    GFR est non-AA >60 >60 ml/min/1.73m2    Calcium 9.2 8.3 - 10.4 MG/DL    Bilirubin, total 0.6 0.2 - 1.1 MG/DL    ALT (SGPT) 16 12 - 65 U/L    AST (SGOT) 35 15 - 37 U/L    Alk.  phosphatase 90 50 - 136 U/L    Protein, total 7.6 6.3 - 8.2 g/dL    Albumin 2.9 (L) 3.2 - 4.6 g/dL    Globulin 4.7 (H) 2.3 - 3.5 g/dL    A-G Ratio 0.6 (L) 1.2 - 3.5     LACTIC ACID    Collection Time: 12/14/20 12:14 PM   Result Value Ref Range    Lactic acid 1.7 0.4 - 2.0 MMOL/L   VALPROIC ACID    Collection Time: 12/14/20 12:14 PM   Result Value Ref Range    Valproic acid 64 50 - 100 ug/ml   PROCALCITONIN    Collection Time: 12/14/20 12:35 PM   Result Value Ref Range    Procalcitonin <0.05 ng/mL   URINALYSIS W/ RFLX MICROSCOPIC    Collection Time: 12/14/20  1:52 PM   Result Value Ref Range    Color ELBA      Appearance CLOUDY      Specific gravity 1.024 (H) 1.001 - 1.023      pH (UA) 6.0 5.0 - 9.0      Protein 30 (A) NEG mg/dL    Glucose Negative mg/dL    Ketone 15 (A) NEG mg/dL    Bilirubin SMALL (A) NEG      Blood TRACE (A) NEG      Urobilinogen 1.0 0.2 - 1.0 EU/dL    Nitrites Negative NEG      Leukocyte Esterase LARGE (A) NEG      WBC >100 (H) 0 /hpf    RBC 0-3 0 /hpf    Epithelial cells 0-3 0 /hpf    Bacteria 0 0 /hpf    Casts 10-20 0 /lpf   EKG, 12 LEAD, INITIAL    Collection Time: 12/14/20  1:52 PM   Result Value Ref Range    Ventricular Rate 93 BPM    Atrial Rate 93 BPM    P-R Interval 116 ms    QRS Duration 88 ms    Q-T Interval 332 ms    QTC Calculation (Bezet) 412 ms    Calculated P Axis 71 degrees    Calculated R Axis 59 degrees    Calculated T Axis -36 degrees    Diagnosis       !! AGE AND GENDER SPECIFIC ECG ANALYSIS !!   Normal sinus rhythm  Cannot rule out Anterior infarct , age undetermined  Abnormal ECG  No previous ECGs available  Confirmed by JOSHUA GROVE (), Renea Leyva ([id number]) on 12/14/2020 6:47:05 PM     SARS-COV-2    Collection Time: 12/14/20  4:29 PM   Result Value Ref Range    Specimen source Nasopharyngeal      COVID-19 rapid test Detected (AA) NOTD          All Micro Results     Procedure Component Value Units Date/Time    CULTURE, BLOOD [[id number]] Collected:  12/15/20 0654    Order Status:  Completed Specimen:  Blood Updated:  12/15/20 0722    CULTURE, URINE [[id number]] Collected:  12/14/20 1352    Order Status:  Completed Specimen:  Urine from Clean catch Updated:  12/14/20 1619    CULTURE, BLOOD [[id number]] Collected:  12/14/20 1235    Order Status:  Completed Specimen:  Blood Updated:  12/14/20 1553          Current Meds:  Current Facility-Administered Medications   Medication Dose Route Frequency    sodium chloride (NS) flush 5-40 mL  5-40 mL IntraVENous Q8H    sodium chloride (NS) flush 5-40 mL  5-40 mL IntraVENous PRN    acetaminophen (TYLENOL) tablet 650 mg  650 mg Oral Q6H PRN    Or    acetaminophen (TYLENOL) suppository 650 mg  650 mg Rectal Q6H PRN    polyethylene glycol (MIRALAX) packet 17 g  17 g Oral DAILY PRN    promethazine (PHENERGAN) tablet 12.5 mg  12.5 mg Oral Q6H PRN    Or    ondansetron (ZOFRAN) injection 4 mg  4 mg IntraVENous Q6H PRN    enoxaparin (LOVENOX) injection 40 mg  40 mg SubCUTAneous DAILY    divalproex (DEPAKOTE SPRINKLE) capsule 250 mg  250 mg Oral TID    0.9% sodium chloride infusion  50 mL/hr IntraVENous CONTINUOUS    cefTRIAXone (ROCEPHIN) 1 g in 0.9% sodium chloride (MBP/ADV) 50 mL MBP  1 g IntraVENous Q24H       Other Studies (last 24 hours):  Ct Head Wo Cont    Result Date: 12/14/2020  Title:  CT Head without contrast. Clinical History: The Female patient is 65years old  presenting with symptoms of unResponsive. Technique: Thin slice axial CT images through the brain were obtained. All CT scans at this facility are performed using dose reduction/dose modulation techniques, as appropriate the performed exam, including the following: Automated Exposure Control; Adjustment of the mA and/or kV according to patient size (this includes techniques or standardized protocols for targeted exams where dose is matched to indication/reason for exam); and Use of Iterative Reconstruction Technique. Radiation Exposure Indices: Reference Air Kerma (Ka,r) = 567 mGy-cm Comparison:  None. Findings:  Cerebrum: Age-related cortical involutional changes are seen with sulcal and ventricular prominence. There is normal gray-white matter differentiation. No evidence of intracranial hemorrhage, mass, or other space-occupying lesion is seen. There are no abnormal extra-axial fluid collections. Cerebellum: Involutional changes. CSF spaces: The ventricular system is within normal limits. The basilar cisterns are unremarkable. Brainstem: No evidence of ischemia, hemorrhage, or mass. Extracranial tissues: Visualized orbits and extracranial soft tissues are unremarkable. Paranasal sinuses/Mastoids: Well-pneumatized and aerated. . Calvarium: Incidental hyperostosis frontalis interna. IMPRESSION: 1. No acute intracranial abnormality. CPT code 60034     Xr Chest Port    Result Date: 12/14/2020  Clinical History: The female patient is a 65year old  presenting with symptoms of cough. Comparison:  none Findings:  Frontal view of the chest was obtained. There is minimal platelike atelectasis in the right midlung field with questionable slight infiltrate at the lung bases, right greater than left. No pleural effusions are demonstrated. The cardiomediastinal silhouette is within normal limits. There are no acute osseous abnormalities. Impression:  1. Questionable minimal infiltrate at lung bases, right greater than left with slight platelike atelectasis in the peripheral right midlung field. CPT code(s) 58399       Assessment and Plan:     Hospital Problems as of 12/15/2020 Date Reviewed: 9/24/2019          Codes Class Noted - Resolved POA    Urinary tract infection ICD-10-CM: N39.0  ICD-9-CM: 599.0  12/14/2020 - Present Unknown        [facility] ICD-10-CM: G40.909  ICD-9-CM: 345.90  12/14/2020 - Present Unknown        Dementia ([facility] ICD-10-CM: F03.90  ICD-9-CM: 294.20  12/14/2020 - Present Unknown        Exposure to COVID-19 virus ICD-10-CM: Z20.828  ICD-9-CM: V01.79  12/14/2020 - Present Unknown        * (Principal) Encephalopathy, metabolic DEF-27-EE: F65.90  ICD-9-CM: 348.31  12/14/2020 - Present Unknown              PLAN:    · Acute metabolic encephalopathy likely secondary to UTI and possibly COVID-19 infectionwe will continue IV antibiotics. Patient has been started on remdesivir. She does not qualify for any plasma. Dementia  · Dementiacontinue supportive care. She is able to have simple conversations intermittently at baseline per reports from family.   At this time she does not volunteer much conversation we will continue to treat her underlying conditions and monitor  · Seizure disorder continue the Depakote  · She has been started on a puréed diet    DC planning/Dispo: Home with family when medically stable. At this time they provide the care that she needs and do not want her placed in a nursing facility.   They also do not want home health services   DVT ppx: Lovenox    Signed:  Missy Yousif MD

## 2020-12-15 NOTE — PROGRESS NOTES
TRANSFER - IN REPORT:    Verbal report received from Henna RN(name) on Quiana Sepulveda  being received from ED(unit) for routine progression of care      Report consisted of patients Situation, Background, Assessment and   Recommendations(SBAR). Information from the following report(s) SBAR was reviewed with the receiving nurse. Opportunity for questions and clarification was provided. Assessment completed upon patients arrival to unit and care assumed.

## 2020-12-15 NOTE — ED NOTES
TRANSFER - OUT REPORT:    Verbal report given to Magali(name) on Keira Aguero  being transferred to 6th Floor(unit) for routine progression of care       Report consisted of patients Situation, Background, Assessment and   Recommendations(SBAR). Information from the following report(s) SBAR was reviewed with the receiving nurse. Lines:   Peripheral IV 12/14/20 Right;Left Hand (Active)   Site Assessment Clean, dry, & intact 12/14/20 1213   Phlebitis Assessment 0 12/14/20 1213   Infiltration Assessment 0 12/14/20 1213   Dressing Status Clean, dry, & intact 12/14/20 1213   Hub Color/Line Status Green 12/14/20 1213       Peripheral IV 12/14/20 Right Wrist (Active)   Site Assessment Clean, dry, & intact 12/14/20 1235   Phlebitis Assessment 0 12/14/20 1235   Infiltration Assessment 0 12/14/20 1235   Dressing Status Clean, dry, & intact 12/14/20 1235   Hub Color/Line Status Blue 12/14/20 1235        Opportunity for questions and clarification was provided.       Patient transported with:   [employer]

## 2020-12-16 LAB
ANION GAP SERPL CALC-SCNC: 8 MMOL/L (ref 7–16)
BASOPHILS # BLD: 0 K/UL (ref 0–0.2)
BASOPHILS NFR BLD: 0 % (ref 0–2)
BUN SERPL-MCNC: 13 MG/DL (ref 8–23)
CALCIUM SERPL-MCNC: 8.5 MG/DL (ref 8.3–10.4)
CHLORIDE SERPL-SCNC: 110 MMOL/L (ref 98–107)
CO2 SERPL-SCNC: 25 MMOL/L (ref 21–32)
CREAT SERPL-MCNC: 0.47 MG/DL (ref 0.6–1)
DIFFERENTIAL METHOD BLD: ABNORMAL
EOSINOPHIL # BLD: 0 K/UL (ref 0–0.8)
EOSINOPHIL NFR BLD: 0 % (ref 0.5–7.8)
ERYTHROCYTE [DISTWIDTH] IN BLOOD BY AUTOMATED COUNT: 13.1 % (ref 11.9–14.6)
GLUCOSE SERPL-MCNC: 82 MG/DL (ref 65–100)
HCT VFR BLD AUTO: 36.7 % (ref 35.8–46.3)
HGB BLD-MCNC: 12.7 G/DL (ref 11.7–15.4)
IMM GRANULOCYTES # BLD AUTO: 0.1 K/UL (ref 0–0.5)
IMM GRANULOCYTES NFR BLD AUTO: 1 % (ref 0–5)
LYMPHOCYTES # BLD: 2.8 K/UL (ref 0.5–4.6)
LYMPHOCYTES NFR BLD: 37 % (ref 13–44)
MCH RBC QN AUTO: 29.8 PG (ref 26.1–32.9)
MCHC RBC AUTO-ENTMCNC: 34.6 G/DL (ref 31.4–35)
MCV RBC AUTO: 86.2 FL (ref 79.6–97.8)
MONOCYTES # BLD: 0.7 K/UL (ref 0.1–1.3)
MONOCYTES NFR BLD: 9 % (ref 4–12)
NEUTS SEG # BLD: 3.9 K/UL (ref 1.7–8.2)
NEUTS SEG NFR BLD: 52 % (ref 43–78)
NRBC # BLD: 0 K/UL (ref 0–0.2)
PLATELET # BLD AUTO: 374 K/UL (ref 150–450)
PMV BLD AUTO: 8.7 FL (ref 9.4–12.3)
POTASSIUM SERPL-SCNC: 2.9 MMOL/L (ref 3.5–5.1)
RBC # BLD AUTO: 4.26 M/UL (ref 4.05–5.2)
SODIUM SERPL-SCNC: 143 MMOL/L (ref 136–145)
WBC # BLD AUTO: 7.6 K/UL (ref 4.3–11.1)

## 2020-12-16 PROCEDURE — 74011250637 HC RX REV CODE- 250/637: Performed by: INTERNAL MEDICINE

## 2020-12-16 PROCEDURE — 80048 BASIC METABOLIC PNL TOTAL CA: CPT

## 2020-12-16 PROCEDURE — 77030040830 HC CATH URETH FOL MDII -A

## 2020-12-16 PROCEDURE — 97162 PT EVAL MOD COMPLEX 30 MIN: CPT

## 2020-12-16 PROCEDURE — 36415 COLL VENOUS BLD VENIPUNCTURE: CPT

## 2020-12-16 PROCEDURE — 77030040393 HC DRSG OPTIFOAM GENT MDII -B

## 2020-12-16 PROCEDURE — 65270000029 HC RM PRIVATE

## 2020-12-16 PROCEDURE — 74011250636 HC RX REV CODE- 250/636: Performed by: INTERNAL MEDICINE

## 2020-12-16 PROCEDURE — 92526 ORAL FUNCTION THERAPY: CPT

## 2020-12-16 PROCEDURE — 97535 SELF CARE MNGMENT TRAINING: CPT

## 2020-12-16 PROCEDURE — 97112 NEUROMUSCULAR REEDUCATION: CPT

## 2020-12-16 PROCEDURE — 85025 COMPLETE CBC W/AUTO DIFF WBC: CPT

## 2020-12-16 PROCEDURE — 74011000258 HC RX REV CODE- 258: Performed by: FAMILY MEDICINE

## 2020-12-16 PROCEDURE — 2709999900 HC NON-CHARGEABLE SUPPLY

## 2020-12-16 PROCEDURE — 74011000250 HC RX REV CODE- 250: Performed by: INTERNAL MEDICINE

## 2020-12-16 PROCEDURE — 97166 OT EVAL MOD COMPLEX 45 MIN: CPT

## 2020-12-16 PROCEDURE — 74011000250 HC RX REV CODE- 250: Performed by: FAMILY MEDICINE

## 2020-12-16 PROCEDURE — 74011000258 HC RX REV CODE- 258: Performed by: INTERNAL MEDICINE

## 2020-12-16 PROCEDURE — 77030041247 HC PROTECTOR HEEL HEELMEDIX MDII -B

## 2020-12-16 RX ORDER — CIPROFLOXACIN 2 MG/ML
400 INJECTION, SOLUTION INTRAVENOUS EVERY 12 HOURS
Status: DISCONTINUED | OUTPATIENT
Start: 2020-12-16 | End: 2020-12-22 | Stop reason: HOSPADM

## 2020-12-16 RX ORDER — METOPROLOL TARTRATE 25 MG/1
12.5 TABLET, FILM COATED ORAL EVERY 12 HOURS
Status: DISCONTINUED | OUTPATIENT
Start: 2020-12-16 | End: 2020-12-22 | Stop reason: HOSPADM

## 2020-12-16 RX ORDER — METOPROLOL TARTRATE 5 MG/5ML
5 INJECTION INTRAVENOUS
Status: DISCONTINUED | OUTPATIENT
Start: 2020-12-16 | End: 2020-12-22 | Stop reason: HOSPADM

## 2020-12-16 RX ORDER — POTASSIUM CHLORIDE 14.9 MG/ML
20 INJECTION INTRAVENOUS EVERY 6 HOURS
Status: COMPLETED | OUTPATIENT
Start: 2020-12-16 | End: 2020-12-17

## 2020-12-16 RX ADMIN — DIVALPROEX SODIUM 250 MG: 125 CAPSULE ORAL at 15:48

## 2020-12-16 RX ADMIN — REMDESIVIR 100 MG: 100 INJECTION, POWDER, LYOPHILIZED, FOR SOLUTION INTRAVENOUS at 10:00

## 2020-12-16 RX ADMIN — ENOXAPARIN SODIUM 30 MG: 30 INJECTION SUBCUTANEOUS at 09:48

## 2020-12-16 RX ADMIN — Medication 10 ML: at 22:11

## 2020-12-16 RX ADMIN — ENOXAPARIN SODIUM 30 MG: 30 INJECTION SUBCUTANEOUS at 20:41

## 2020-12-16 RX ADMIN — DIVALPROEX SODIUM 250 MG: 125 CAPSULE ORAL at 09:49

## 2020-12-16 RX ADMIN — VITAMIN D, TAB 1000IU (100/BT) 2 TABLET: 25 TAB at 09:49

## 2020-12-16 RX ADMIN — CIPROFLOXACIN 400 MG: 2 INJECTION, SOLUTION INTRAVENOUS at 20:36

## 2020-12-16 RX ADMIN — POTASSIUM CHLORIDE 20 MEQ: 200 INJECTION, SOLUTION INTRAVENOUS at 17:10

## 2020-12-16 RX ADMIN — Medication 10 ML: at 06:00

## 2020-12-16 RX ADMIN — DEXAMETHASONE 6 MG: 4 TABLET ORAL at 09:49

## 2020-12-16 RX ADMIN — VALPROATE SODIUM 250 MG: 100 INJECTION, SOLUTION INTRAVENOUS at 22:10

## 2020-12-16 RX ADMIN — Medication 10 ML: at 13:11

## 2020-12-16 RX ADMIN — CEFTRIAXONE SODIUM 1 G: 1 INJECTION, POWDER, FOR SOLUTION INTRAMUSCULAR; INTRAVENOUS at 15:47

## 2020-12-16 NOTE — PROGRESS NOTES
Hospitalist Progress Note     Admit Date:  2020  1:24 PM   Name:  Morris Mcgarry   Age:  65 y.o.  :  1950   MRN:  [id number]   PCP:  Fletcher Tucker PA-C  Treatment Team: Attending Provider: Griselda Del Castillo MD; Utilization Review: Lyle Bee RN; Care Manager: Hannah Marcial, RN; Charge Nurse: Sahil Cheng RN; Occupational Therapist: Donald Berger OT; Primary Nurse: Fang Graham    Subjective:   As per prior documentation:    \"Luda Baez is a 65 y.o. female with medical history significant for dementia, seizures who was brought in by her son due to decreased mental status labored breathing and decreased p.o. intake. Patient is somnolent and is unable to answer any questions. Her son who is at bedside provided the medical history. Patient normally is ANO x1-2 and able to engage in simple conversation. She is able to walk with assistance. However, in the past 3 days patient has become less active and lethargic with decreased p.o. intake. Per son, this is how she normally gets when she gets UTI. Patient was started on ciprofloxacin when she got 2 doses so far. Patient condition worsened and she has not been able to get up or speak much in the past 2 days. He also noticed that patient was tachypneic and labored breathing over the weekend which got worse last night. Of note patient has a family member who tested positive for Covid last Monday. She does not have any known fevers, chills, nausea, vomiting, abdominal pain or diarrhea.     In the ED, patient is afebrile and hemodynamically stable with saturation above 94% on room air. Blood work work-up is unrevealing. Urine analysis shows cloudy urine with large leukocyte esterase and more than 100 WBC. CXR with minimal infiltrate at lung bases, right greater than left. Procalc of < 0.05.   CT head without any acute intracranial abnormalities.     ROS unable to be obtained due to patient's clinical status. \"       December 15, 2020patient seen and evaluated. Resting comfortably and not very interactive or cooperative. Hard of hearing but eventually smiles for me after repeatedly being asked. She is unable to give a history secondary to her underlying dementia. She did test positive for COVID-19. December 16, 2020patient seen and evaluated. Resting comfortably. Not very interactive or cooperative today. She does track me in the room. Again unable to give a history secondary to her underlying dementia per family this is her baseline and the patient has probably not walked for several weeks. She does get this way when she has a urinary tract infection. It appears that she has had at least 3 of them this year. Per her son he has been blending food for her and letting her suck it up in a straw. She barely responds to them and does not like healthcare facilities. Objective:     Patient Vitals for the past 24 hrs:   Temp Pulse Resp BP SpO2   12/16/20 1207 98.8 °F (37.1 °C) 94 24 (!) 149/76 93 %   12/16/20 0711 99 °F (37.2 °C) (!) 104 24 125/71    12/16/20 0426 99.7 °F (37.6 °C) 95 17 (!) 145/77 98 %   12/15/20 2331 99.2 °F (37.3 °C) 86 18 132/68 96 %   12/15/20 1909 98.6 °F (37 °C) 86 18 126/68 96 %     Oxygen Therapy  O2 Sat (%): 93 % (12/16/20 1207)  Pulse via Oximetry: 67 beats per minute (12/15/20 0211)  O2 Device: Room air (12/16/20 1305)    Intake/Output Summary (Last 24 hours) at 12/16/2020 1644  Last data filed at 12/16/2020 1553  Gross per 24 hour   Intake 180 ml   Output 600 ml   Net -420 ml         General:    Thin frail and resting but easily arousable alert. CV:   RRR. No murmur, rub, or gallop. Lungs:   Air entry equal bilaterally with no wheezing, rhonchi, or rales. Abdomen:   Soft, nontender, nondistended. Extremities: Warm and dry. No cyanosis or edema. Skin:     No rashes or jaundice.      Data Review:  I have reviewed all labs, meds, telemetry events, and studies from the last 24 hours. Recent Results (from the past 24 hour(s))   CBC WITH AUTOMATED DIFF    Collection Time: 12/16/20  7:51 AM   Result Value Ref Range    WBC 7.6 4.3 - 11.1 K/uL    RBC 4.26 4.05 - 5.2 M/uL    HGB 12.7 11.7 - 15.4 g/dL    HCT 36.7 35.8 - 46.3 %    MCV 86.2 79.6 - 97.8 FL    MCH 29.8 26.1 - 32.9 PG    MCHC 34.6 31.4 - 35.0 g/dL    RDW 13.1 11.9 - 14.6 %    PLATELET 608 577 - 859 K/uL    MPV 8.7 (L) 9.4 - 12.3 FL    ABSOLUTE NRBC 0.00 0.0 - 0.2 K/uL    DF AUTOMATED      NEUTROPHILS 52 43 - 78 %    LYMPHOCYTES 37 13 - 44 %    MONOCYTES 9 4.0 - 12.0 %    EOSINOPHILS 0 (L) 0.5 - 7.8 %    BASOPHILS 0 0.0 - 2.0 %    IMMATURE GRANULOCYTES 1 0.0 - 5.0 %    ABS. NEUTROPHILS 3.9 1.7 - 8.2 K/UL    ABS. LYMPHOCYTES 2.8 0.5 - 4.6 K/UL    ABS. MONOCYTES 0.7 0.1 - 1.3 K/UL    ABS. EOSINOPHILS 0.0 0.0 - 0.8 K/UL    ABS. BASOPHILS 0.0 0.0 - 0.2 K/UL    ABS. IMM.  GRANS. 0.1 0.0 - 0.5 K/UL   METABOLIC PANEL, BASIC    Collection Time: 12/16/20  7:51 AM   Result Value Ref Range    Sodium 143 136 - 145 mmol/L    Potassium 2.9 (L) 3.5 - 5.1 mmol/L    Chloride 110 (H) 98 - 107 mmol/L    CO2 25 21 - 32 mmol/L    Anion gap 8 7 - 16 mmol/L    Glucose 82 65 - 100 mg/dL    BUN 13 8 - 23 MG/DL    Creatinine 0.47 (L) 0.6 - 1.0 MG/DL    GFR est AA >60 >60 ml/min/1.73m2    GFR est non-AA >60 >60 ml/min/1.73m2    Calcium 8.5 8.3 - 10.4 MG/DL        All Micro Results     Procedure Component Value Units Date/Time    CULTURE, BLOOD [[id number]] Collected: 12/15/20 0654    Order Status: Completed Specimen: Blood Updated: 12/16/20 1126     Special Requests: --        LEFT  HAND       Culture result: NO GROWTH 1 DAY       CULTURE, URINE [[id number]]  (Abnormal) Collected: 12/14/20 1352    Order Status: Completed Specimen: Urine from Clean catch Updated: 12/16/20 0729     Special Requests: NO SPECIAL REQUESTS        Culture result:       50,000-100,000 COLONIES/mL PRESUMPTIVE ENTEROCOCCUS SPECIES SUBCULTURE IN PROGRESS <10,000  COLONIES/mL  NORMAL SKIN ALONSO ISOLATED      CULTURE, BLOOD [[id number]] Collected: 12/14/20 1235    Order Status: Completed Specimen: Blood Updated: 12/16/20 0716     Special Requests: --        LEFT  HAND       Culture result: NO GROWTH 2 DAYS             Current Meds:  Current Facility-Administered Medications   Medication Dose Route Frequency    potassium chloride 20 mEq in 100 ml IVPB  20 mEq IntraVENous Q6H    ciprofloxacin (CIPRO) 400 mg in D5W IVPB (premix)  400 mg IntraVENous Q12H    enoxaparin (LOVENOX) injection 30 mg  30 mg SubCUTAneous Q12H    dexAMETHasone (DECADRON) tablet 6 mg  6 mg Oral DAILY    cholecalciferol (VITAMIN D3) (1000 Units /25 mcg) tablet 2 Tab  2,000 Units Oral DAILY    remdesivir 100 mg in 0.9% sodium chloride 250 mL IVPB  100 mg IntraVENous Q24H    0.9% sodium chloride infusion for Remdesivir  30 mL IntraVENous Q24H    sodium chloride (NS) flush 5-40 mL  5-40 mL IntraVENous Q8H    sodium chloride (NS) flush 5-40 mL  5-40 mL IntraVENous PRN    acetaminophen (TYLENOL) tablet 650 mg  650 mg Oral Q6H PRN    Or    acetaminophen (TYLENOL) suppository 650 mg  650 mg Rectal Q6H PRN    polyethylene glycol (MIRALAX) packet 17 g  17 g Oral DAILY PRN    promethazine (PHENERGAN) tablet 12.5 mg  12.5 mg Oral Q6H PRN    Or    ondansetron (ZOFRAN) injection 4 mg  4 mg IntraVENous Q6H PRN    divalproex (DEPAKOTE SPRINKLE) capsule 250 mg  250 mg Oral TID    cefTRIAXone (ROCEPHIN) 1 g in 0.9% sodium chloride (MBP/ADV) 50 mL MBP  1 g IntraVENous Q24H       Other Studies (last 24 hours):  No results found.     Assessment and Plan:     Hospital Problems as of 12/16/2020 Date Reviewed: 9/24/2019          Codes Class Noted - Resolved POA    COVID-19 ICD-10-CM: U07.1  ICD-9-CM: 079.89  12/15/2020 - Present Yes        Urinary tract infection ICD-10-CM: N39.0  ICD-9-CM: 599.0  12/14/2020 - Present Unknown        [facility] ([facility] ICD-10-CM: G40.909  ICD-9-CM: 345.90  12/14/2020 - Present Unknown        Dementia ([facility] ICD-10-CM: F03.90  ICD-9-CM: 294.20  12/14/2020 - Present Unknown        Exposure to COVID-19 virus ICD-10-CM: Z20.828  ICD-9-CM: V01.79  12/14/2020 - Present Unknown        * (Principal) Encephalopathy, metabolic DDD-29-CA: S61.70  ICD-9-CM: 348.31  12/14/2020 - Present Unknown              PLAN:    · Acute metabolic encephalopathy likely secondary to UTI and possibly COVID-19 infectionwe will continue IV antibiotics-review of old cultures reveal her Enterococcus and prior E. coli was sensitive to ciprofloxacin so I will add this to her regimen. · Patient has been started on remdesivir. She does not qualify for any plasma. · Dementiacontinue supportive care. She is able to have simple conversations intermittently at baseline per reports from family. At this time she does not volunteer much conversation we will continue to treat her underlying conditions and monitor  · Seizure disorder continue the Depakote  · Speech is held her diet today. Possibly can try giving her stuff through a straw tomorrow. DC planning/Dispo: Home with family when medically stable. At this time they provide the care that she needs and do not want her placed in a nursing facility. They also do not want home health services.     DVT ppx: Lovenox    Signed:  Phyllis Awad MD

## 2020-12-16 NOTE — PROGRESS NOTES
Provider-please call pt's son, Meghann Munoz at [phone]. RN updated him as appropriate today. He is asking to speak with provider.

## 2020-12-16 NOTE — PROGRESS NOTES
ACUTE OT GOALS:  (Developed with and agreed upon by patient and/or caregiver.)  1. Pt will complete simple grooming/ hygiene with min A  2. Pt will complete bed mobility with min X2 in prep for ADLs  3. Pt will stand for ADLs with mod A  4. Pt will demonstrate improved cognition to follow 50% or more simple commands with mod cues     OCCUPATIONAL THERAPY ASSESSMENT: Initial Assessment and Daily Note OT Treatment Day # 1    Morris Mcgarry is a 65 y.o. female   PRIMARY DIAGNOSIS: Encephalopathy, metabolic       Reason for Referral:  AMS, labored breathing, weakness  ICD-10: Treatment Diagnosis: Generalized Muscle Weakness (M62.81)  [place] / [place] / Product Type: Medicare /   ASSESSMENT:     REHAB RECOMMENDATIONS:   Recommendation to date pending progress:  Setting:   No further skilled therapy   Equipment:    To Be Determined     INITIAL/CURRENT LEVEL OF FUNCTION:  (Most Recently Demonstrated) PRIOR LEVEL OF FUNCTION:  (Prior to Hospitalization)   Bathing:   Total Assistance  Dressing:   Total Assistance  Feeding/Grooming:   Total Assistance  Toileting:   Total Assistance Bathing:   Unknown  Dressing:   Unknown  Feeding/Grooming:   Unknown  Toileting:   Unknown     ASSESSMENT:  Ms. Michael Baez presented non-verbal, briefly glanced at therapist upon arrival but otherwise did not follow commands or visually attend. Pt noted to have stiffness in all extremities, hands in fists with tight intrinsic muscles. Pt required total A for simple grooming and hygiene tasks, max X2 for bed mobility and to stand. Unsure of baseline as pt unable to provide, per chart pt lives in a camper in son's backyard and requires assistance for ADLs. Per chart, family wants to take pt home. Will loosely follow on trial basis to determine whether pt is able to follow commands/ participate in OT to benefit from OT and progress towards goals.    At this time, patient is appropriate for Co-treatment with physical therapy due to patient's decreased overall endurance/tolerance levels, as well as need for high level skilled assistance to complete functional transfers/mobility and functional tasks. Lluvia Metz is appropriate for a multidisciplinary co-treatment of PT and OT to address goals of both disciplines. SUBJECTIVE:   Ms. Cheyenne Cool did not verbalize    SOCIAL HISTORY/LIVING ENVIRONMENT:     Per chart, lives in a camper in son's backyard and requires assistance for ADLs.      OBJECTIVE:     PAIN: VITAL SIGNS: LINES/DRAINS:   Pre Treatment: Pain Screen  Pain Scale 1: FLACC  Pain Intensity 1: 0  Post Treatment: 0     O2 Device: Room air     GROSS EVALUATION:  BUE Within Functional Limits Abnormal/ Functional Abnormal/ Non-Functional (see comments) Not Tested Comments:   AROM [] [] [x] [] Stiffness in all joints, twitching noted in fingers but otherwise did not move either UE   PROM [] [] [x] [] Stiffness in all joints, able to range to ~50-75% of normal range   Strength [] [] [x] []    Balance [] [] [x] []    Posture [] [] [] [x]    Sensation [] [] [] [x]    Coordination [] [] [x] []    Tone [] [] [x] []    Edema [] [] [] []    Activity Tolerance [] [] [x] []     [] [] [] []      COGNITION/  PERCEPTION: Intact Impaired   (see comments) Comments:   Orientation [] [x]    Vision [] [] Unable to assess   Hearing [] []    Judgment/ Insight [] [x]    Attention [] [x]    Memory [] [x]    Command Following [] [x]    Emotional Regulation [] []     [] []      ACTIVITIES OF DAILY LIVING: I Mod I S SBA CGA Min Mod Max Total NT Comments   BASIC ADLs:              Bathing/ Showering [] [] [] [] [] [] [] [] [] []    Toileting [] [] [] [] [] [] [] [] [] []    Dressing [] [] [] [] [] [] [] [] [] []    Feeding [] [] [] [] [] [] [] [] [] []    Grooming [] [] [] [] [] [] [] [] [x] []    Personal Device Care [] [] [] [] [] [] [] [] [] []    Functional Mobility [] [] [] [] [] [] [] [] [] []        MOBILITY: I Mod I S SBA CGA Min Mod Max Total  NT x2 Comments:   Supine to sit [] [] [] [] [] [] [] [x] [] [] [x]    Sit to supine [] [] [] [] [] [] [] [x] [] [] [x]    Sit to stand [] [] [] [] [] [] [] [x] [] [] [x]    Bed to chair [] [] [] [] [] [] [] [] [] [x] []    I=Independent, Mod I=Modified Independent, S=Supervision, SBA=Standby Assistance, CGA=Contact Guard Assistance,   Min=Minimal Assistance, Mod=Moderate Assistance, Max=Maximal Assistance, Total=Total Assistance, NT=Not [facility] 6 Clicks   Daily Activity Inpatient Short Form        How much help from another person does the patient currently need. .. Total A Lot A Little None   1. Putting on and taking off regular lower body clothing? [x] 1   [] 2   [] 3   [] 4   2. Bathing (including washing, rinsing, drying)? [x] 1   [] 2   [] 3   [] 4   3. Toileting, which includes using toilet, bedpan or urinal?   [x] 1   [] 2   [] 3   [] 4   4. Putting on and taking off regular upper body clothing? [x] 1   [] 2   [] 3   [] 4   5. Taking care of personal grooming such as brushing teeth? [x] 1   [] 2   [] 3   [] 4   6. Eating meals? [x] 1   [] 2   [] 3   [] 4   © 2007, Trustees of [address] [place] [id number], under license to [employer]. All rights reserved     Score:  Initial: 6 Most Recent: X (Date: -- )   Interpretation of Tool:  Represents activities that are increasingly more difficult (i.e. Bed mobility, Transfers, Gait). PLAN:   FREQUENCY/DURATION: OT Plan of Care: 3 times/week(trial) for duration of hospital stay or until stated goals are met, which ever comes first.    PROBLEM LIST:   (Skilled intervention is medically necessary to address:)  1. Decreased ADL/Functional Activities  2. Decreased Activity Tolerance  3. Decreased AROM/PROM  4. Decreased Balance  5. Decreased Cognition  6. Decreased Coordination  7. Decreased Gait Ability  8. Decreased Strength  9.  Decreased Transfer Abilities   INTERVENTIONS PLANNED:   (Benefits and precautions of occupational therapy have been discussed with the patient.)  1. Self Care Training  2. Therapeutic Activity  3. Therapeutic Exercise/HEP  4. Neuromuscular Re-education  5. Education     TREATMENT:     EVALUATION: Moderate Complexity : (Untimed Charge)    TREATMENT:   ($$ Self Care/Home Management: 8-22 mins    )  Self Care (8 Minutes): Self care including Grooming to decrease level of assistance required.     AFTER TREATMENT POSITION/PRECAUTIONS:  Alarm Activated, Bed, Needs within reach and RN notified    INTERDISCIPLINARY COLLABORATION:  RN/PCT and PT/PTA    TOTAL TREATMENT DURATION:  OT Patient Time In/Time Out  Time In: 1306  Time Out: Yohana Love [address] [place] [address]

## 2020-12-16 NOTE — PROGRESS NOTES
LTG: Patient will tolerate least restrictive diet without overt signs or symptoms of airway compromise. STG: Patient will tolerate pureed diet and thin liquids without overt signs or symptoms of airway compromise. Discontinued 12/16/2020  STG: patient will participate in ongoing po trials in efforts to advance diet. STG: Patient will participate in modified barium swallow study as clinically indicated. SPEECH LANGUAGE PATHOLOGY: DYSPHAGIA- Daily Note 1    NAME/AGE/GENDER: Alie Bermeo is a 65 y.o. female  DATE: 12/16/2020  PRIMARY DIAGNOSIS: Urinary tract infection [N39.0]  Altered mental status [R41.82]      ICD-10: Treatment Diagnosis: R13.12 Dysphagia, Oropharyngeal Phase    RECOMMENDATIONS   DIET:    NPO except meds    MEDICATIONS: Crushed in puree     ASPIRATION PRECAUTIONS  · Slow rate of intake  · Small bites/sips  · Upright at 90 degrees during meal     COMPENSATORY STRATEGIES/MODIFICATIONS  · n/a     RECOMMENDATIONS for CONTINUED SPEECH THERAPY:   YES: Anticipate need for ongoing speech therapy during this hospitalization and at next level of care. ASSESSMENT   Patient presents with significant oral dysphagia increasing risk of pharyngeal dysphagia. Patient with prolonged oral phase, tongue pumping and intermittent holding with puree. Unable to siphon via straw, despite repeatedly attempting and providing verbal/tactile cues and unable to safely coordinate acceptance via cup due to mentation. Recommend NPO except meds crushed in puree. Will follow up for ongoing po trials in efforts to resume oral diet. CONTINUATION OF SKILLED SERVICES/MEDICAL NECESSITY:   Patient is expected to demonstrate progress in  swallow strength, swallow timeliness, swallow function, diet tolerance and swallow safety in order to  improve swallow safety, work toward diet advancement and decrease aspiration risk.  Patient continues to require skilled intervention due to dysphagia. EDUCATION:  · Recommendations discussed with Patient and RN  REHABILITATION POTENTIAL FOR STATED GOALS: Excellent    PLAN    FREQUENCY/DURATION: Continue to follow patient 2 times a week for duration of hospital stay to address above goals. - Recommendations for next treatment session: Next treatment will address potrials    SUBJECTIVE   Awake but Non verbal. No command following. Closing eyes unless tactile/verbal cues. Oxygen Device: room air. Pain: Pain Scale 1: FLACC  Pain Intensity 1: 0    History of Present Injury/Illness: Ms. Cindy Burgos  has a past medical history of Neurological disorder. . She also  has a past surgical history that includes hx hysterectomy. PRECAUTIONS/ALLERGIES: Amoxicillin     Problem List:  (Impairments causing functional limitations):  1. Oral dysphagia    Orientation:  did not respond    Cognitive-Linguistic Screening: patient did not follow commands or attempt to answer questions. History of dementia, but mental status worse than baseline at this time per chart and nursing report. OBJECTIVE     Swallow treatment-  Patient with audible breathing through nose upon entry on room air. Would not siphon via straw despite numerous attempts and tactile/verbal cueing. Suspect patient's fluctuating mentation/baseline dementia impacting inconsistent abilities,however was not able to coordinate accepting via cup safely either. Consumed bites of puree with increased delay, tongue pumping, and intermittent holding. No overt s/sx airway compromise.        Tool Used: Dysphagia Outcome and Severity Scale (JOANNA)    Score Comments   Normal Diet  [] 7 With no strategies or extra time needed   Functional Swallow  [] 6 May have mild oral or pharyngeal delay   Mild Dysphagia  [] 5 Which may require one diet consistency restricted    Mild-Moderate Dysphagia  [] 4 With 1-2 diet consistencies restricted   Moderate Dysphagia  [] 3 With 2 or more diet consistencies restricted   Moderate-Severe Dysphagia [] 2 With partial PO strategies (trials with ST only)   Severe Dysphagia  [] 1 With inability to tolerate any PO safely      Score:  Initial:3 Most Recent: 2 (Date 12/16/20 )   Interpretation of Tool: The Dysphagia Outcome and Severity Scale (JOANNA) is a simple, easy-to-use, 7-point scale developed to systematically rate the functional severity of dysphagia based on objective assessment and make recommendations for diet level, independence level, and type of nutrition.          INTERDISCIPLINARY COLLABORATION: RN    After treatment position/precautions:  · Upright in bed  · RN notified    Total Treatment Duration:   Time In: 3902  Time Out: [address], [facility], [facility], [address] [place] [address]

## 2020-12-16 NOTE — PROGRESS NOTES
ACUTE PHYSICAL THERAPY GOALS:  (Developed with and agreed upon by patient and/or caregiver.)  (1.) Leonel Platt will move from supine to sit and sit to supine , scoot up and down and roll side to side with CONTACT GUARD ASSIST within 7 treatment day(s). (2.) Leonel Platt will transfer from bed to chair and chair to bed with CONTACT GUARD ASSIST using the least restrictive device within 7 treatment day(s). (3.) Leonel Platt will perform seated static and dynamic balance activities x 20 minutes with STAND BY ASSIST to improve safety within 7 treatment day(s). (4.) Leonel Platt will perform standing static and dynamic balance activities x 10 minutes with MINIMAL ASSIST to improve safety within 7 treatment day(s). PHYSICAL THERAPY ASSESSMENT: Initial Assessment PT Treatment Day # 1    Leonel Platt is a 65 y.o. female   PRIMARY DIAGNOSIS: Encephalopathy, metabolic       Reason for Referral:    ICD-10: Treatment Diagnosis: Generalized Muscle Weakness (M62.81)  Other lack of cordination (R27.8)  Difficulty in walking, Not elsewhere classified (R26.2)  Other abnormalities of gait and mobility (R26.89)  [place] / Product Type: Medicare /     ASSESSMENT:     REHAB RECOMMENDATIONS:   Recommendation to date pending progress:  [address] [place] [address] - family has declined, home with family  Equipment:    None     INITIAL/CURRENT LEVEL OF FUNCTION:  (Most Recently Demonstrated) PRIOR LEVEL OF FUNCTION:  (Prior to Hospitalization)   Bed Mobility:   Maximal Assistance x 2  Sit to Stand:   Maximal Assistance x 2  Transfers:   Maximal Assistance x 2  Gait/Mobility:   Unable to perform Bed Mobility:   Unknown  Sit to Stand:   Unknown  Transfers:   Unknown  Gait/Mobility:   Unknown (per chart pt \"able to walk with assistance\")     ASSESSMENT:  Ms. Michelle Frost is a 65year old F who presents to hospital with decreased mental status and labored breathing.  PMH significant for dementia. Pt requiring heavy assist for all mobility, noted decreased initiation. Decreased ROM BLE. Fair sitting balance control edge of bed, constant support to maitnain standing, unable to take steps. Pt presents as functioning below her baseline, with deficits in mobility including transfers, gait, balance, and activity tolerance. Pt will benefit from skilled therapy services to address stated deficits to promote return to highest level of function, independence, and safety. Will continue to follow. At this time, patient is appropriate for Co-treatment with physical therapy due to patient's decreased overall endurance/tolerance levels, as well as need for high level skilled assistance to complete functional transfers/mobility and functional tasks. Stanislav Holland is appropriate for a multidisciplinary co-treatment of PT and OT to address goals of both disciplines. SUBJECTIVE:   Ms. Sherri Chappell is nonverbal, smiling. SOCIAL HISTORY/LIVING ENVIRONMENT:     patient currently lives in his backyard in a camper. Patient has family in and out all day. Elda Fontaine stated he and his siblings take turns daily to care for patient. Elda Fontaine stated patient does not use DME at this time.     Patient does require assistance with ADL's and gets that assistance from her family  OBJECTIVE:     PAIN: VITAL SIGNS: LINES/DRAINS:   Pre Treatment: Pain Screen  Pain Scale 1: Numeric (0 - 10)  Pain Intensity 1: 0  Post Treatment: 0/10 Vital Signs  O2 Device: Room air None  O2 Device: Room air     GROSS EVALUATION:  BLE Within Functional Limits Abnormal/ Functional Abnormal/ Non-Functional (see comments) Not Tested Comments:   AROM [] [x] [] []    PROM [] [x] [] []    Strength [] [x] [] []    Balance [] [x] [] []  fair sitting balance, poor standing balance   Posture [] [x] [] []    Sensation [] [] [] [x]    Coordination [] [] [] []    Tone [] [] [] [x]    Edema [] [] [] [x]    Activity Tolerance [] [x] [] []  labored breathing with minimal exertion    [] [] [] []      COGNITION/  PERCEPTION: Intact Impaired   (see comments) Comments:   Orientation [] [x]    Vision [] []  Not formally assessed - appears to attend more to L side than R   Hearing [] []    Command Following [] [x]    Safety Awareness [] []     [] []      MOBILITY: I Mod I S SBA CGA Min Mod Max Total  NT x2 Comments:   Bed Mobility    Rolling [] [] [] [] [] [] [x] [] [] [] [x]    Supine to Sit [] [] [] [] [] [] [] [x] [] [] [x]    Scooting [] [] [] [] [] [] [] [x] [] [] [x]    Sit to Supine [] [] [] [] [] [] [] [x] [] [] [x]    Transfers    Sit to Stand [] [] [] [] [] [] [] [x] [] [] [x]    Bed to Chair [] [] [] [] [] [] [] [] [] [] []    Stand to Sit [] [] [] [] [] [] [] [x] [] [] [x]    I=Independent, Mod I=Modified Independent, S=Supervision, SBA=Standby Assistance, CGA=Contact Guard Assistance,   Min=Minimal Assistance, Mod=Moderate Assistance, Max=Maximal Assistance, Total=Total Assistance, NT=Not Tested  GAIT: I Mod I S SBA CGA Min Mod Max Total  NT Comments:   Level of Assistance [] [] [] [] [] [] [] [] [] [x]    Distance N/A    DME N/A    Gait Quality N/A    I=Independent, Mod I=Modified Independent, S=Supervision, SBA=Standby Assistance, CGA=Contact Guard Assistance,   Min=Minimal Assistance, Mod=Moderate Assistance, Max=Maximal Assistance, Total=Total Assistance, NT=Not Tested    [facility] [address] [facility] Mobility Inpatient Short Form       How much difficulty does the patient currently have. .. Unable A Lot A Little None   1. Turning over in bed (including adjusting bedclothes, sheets and blankets)? [] 1   [x] 2   [] 3   [] 4   2. Sitting down on and standing up from a chair with arms ( e.g., wheelchair, bedside commode, etc.)   [] 1   [x] 2   [] 3   [] 4   3. Moving from lying on back to sitting on the side of the bed? [] 1   [x] 2   [] 3   [] 4   How much help from another person does the patient currently need. ..  Total A Lot A Little None   4. Moving to and from a bed to a chair (including a wheelchair)? [] 1   [x] 2   [] 3   [] 4   5. Need to walk in hospital room? [] 1   [x] 2   [] 3   [] 4   6. Climbing 3-5 steps with a railing? [] 1   [x] 2   [] 3   [] 4   © 2007, Trustees of [address] [place] [address] [address] [id number], under license to [employer]. All rights reserved     Score:  Initial: 12 Most Recent: X (Date: -- )    Interpretation of Tool:  Represents activities that are increasingly more difficult (i.e. Bed mobility, Transfers, Gait). PLAN:   FREQUENCY/DURATION: PT Plan of Care: 3 times/week(trial basis) for duration of hospital stay or until stated goals are met, which ever comes first.    PROBLEM LIST:   (Skilled intervention is medically necessary to address:)  1. Decreased ADL/Functional Activities  2. Decreased Activity Tolerance  3. Decreased AROM/PROM  4. Decreased Balance  5. Decreased Cognition  6. Decreased Coordination  7. Decreased Gait Ability  8. Decreased Strength  9. Decreased Transfer Abilities   INTERVENTIONS PLANNED:   (Benefits and precautions of physical therapy have been discussed with the patient.)  1. Therapeutic Activity  2. Therapeutic Exercise/HEP  3. Neuromuscular Re-education  4. Gait Training  5. Education     TREATMENT:     EVALUATION: Moderate Complexity : (Untimed Charge)    TREATMENT:   ($$ Neuromuscular Re-education: 8-22 mins    )  Neuromuscular Re-education (10 Minutes): Neuromuscular Re-education included Balance Training, Coordination training, Functional mobility with facilitation, Postural training, Sitting balance training and Standing balance training to improve Balance, Coordination, Functional Mobility and Postural Control.     Co-Treatment PT/OT necessary due to patient's decreased overall endurance/tolerance levels, as well as need for high level skilled assistance to complete functional transfers/mobility and functional tasks    AFTER TREATMENT POSITION/PRECAUTIONS:  Alarm Activated, Bed, Needs within reach and RN notified    INTERDISCIPLINARY COLLABORATION:  RN/PCT, PT/PTA and OT/SALINAS    TOTAL TREATMENT DURATION:  PT Patient Time In/Time Out  Time In: 1305  Time Out: ASPEN Villa

## 2020-12-16 NOTE — PROGRESS NOTES
Care Management Interventions  PCP Verified by CM: Yes  Mode of Transport at Discharge: Self  Transition of Care Consult (CM Consult): Discharge Planning  Discharge Durable Medical Equipment: No  Physical Therapy Consult: No  Occupational Therapy Consult: No  Speech Therapy Consult: Yes  Current Support Network: Other(Patient lives in a camper in sons back yard)  Confirm Follow Up Transport: Family  The Patient and/or Patient Representative was Provided with a Choice of Provider and Agrees with the Discharge Plan?: No  Freedom of Choice List was Provided with Basic Dialogue that Supports the Patient's Individualized Plan of Care/Goals, Treatment Preferences and Shares the Quality Data Associated with the Providers?: No   Resource Information Provided?: No  Discharge Location  Discharge Placement: Home     CM spoke with patient son Nubia Koo [phone] who verified all demographic information to be correct. Mu Umana informed CM patient currently lives in his backyard in a camper. Patient has family in and out all day. Javiermichellesonu Crawfordkai stated he and his siblings take turns daily to care for patient. Mu Umana stated patient does not use DME at this time. Patient does require assistance with ADL's and gets that assistance from her family. Patient uses [facility] to obtain her medications. Patient family drives her to her appointments and to get her medications. Patient has never had [place] or been to [facility]. Family would like for patient to discharge back home. CM will continue to follow patient during hospitalization for discharge planning and needs. Please consult or notify CM of new needs.

## 2020-12-17 PROBLEM — E87.6 HYPOKALEMIA: Status: ACTIVE | Noted: 2020-12-17

## 2020-12-17 LAB
ANION GAP SERPL CALC-SCNC: 7 MMOL/L (ref 7–16)
BUN SERPL-MCNC: 13 MG/DL (ref 8–23)
CALCIUM SERPL-MCNC: 8.6 MG/DL (ref 8.3–10.4)
CHLORIDE SERPL-SCNC: 109 MMOL/L (ref 98–107)
CO2 SERPL-SCNC: 26 MMOL/L (ref 21–32)
CREAT SERPL-MCNC: 0.43 MG/DL (ref 0.6–1)
ERYTHROCYTE [DISTWIDTH] IN BLOOD BY AUTOMATED COUNT: 13.2 % (ref 11.9–14.6)
GLUCOSE SERPL-MCNC: 80 MG/DL (ref 65–100)
HCT VFR BLD AUTO: 35.4 % (ref 35.8–46.3)
HGB BLD-MCNC: 11.9 G/DL (ref 11.7–15.4)
MAGNESIUM SERPL-MCNC: 2 MG/DL (ref 1.8–2.4)
MCH RBC QN AUTO: 29.2 PG (ref 26.1–32.9)
MCHC RBC AUTO-ENTMCNC: 33.6 G/DL (ref 31.4–35)
MCV RBC AUTO: 87 FL (ref 79.6–97.8)
NRBC # BLD: 0 K/UL (ref 0–0.2)
PLATELET # BLD AUTO: 374 K/UL (ref 150–450)
PMV BLD AUTO: 8.8 FL (ref 9.4–12.3)
POTASSIUM SERPL-SCNC: 3.3 MMOL/L (ref 3.5–5.1)
RBC # BLD AUTO: 4.07 M/UL (ref 4.05–5.2)
SODIUM SERPL-SCNC: 142 MMOL/L (ref 136–145)
WBC # BLD AUTO: 9.7 K/UL (ref 4.3–11.1)

## 2020-12-17 PROCEDURE — 74011250637 HC RX REV CODE- 250/637: Performed by: INTERNAL MEDICINE

## 2020-12-17 PROCEDURE — 92526 ORAL FUNCTION THERAPY: CPT

## 2020-12-17 PROCEDURE — 65270000029 HC RM PRIVATE

## 2020-12-17 PROCEDURE — 74011000250 HC RX REV CODE- 250: Performed by: INTERNAL MEDICINE

## 2020-12-17 PROCEDURE — 2709999900 HC NON-CHARGEABLE SUPPLY

## 2020-12-17 PROCEDURE — 74011000258 HC RX REV CODE- 258: Performed by: INTERNAL MEDICINE

## 2020-12-17 PROCEDURE — 74011250637 HC RX REV CODE- 250/637: Performed by: HOSPITALIST

## 2020-12-17 PROCEDURE — 74011250636 HC RX REV CODE- 250/636: Performed by: INTERNAL MEDICINE

## 2020-12-17 PROCEDURE — 36415 COLL VENOUS BLD VENIPUNCTURE: CPT

## 2020-12-17 PROCEDURE — 80048 BASIC METABOLIC PNL TOTAL CA: CPT

## 2020-12-17 PROCEDURE — 85027 COMPLETE CBC AUTOMATED: CPT

## 2020-12-17 PROCEDURE — 83735 ASSAY OF MAGNESIUM: CPT

## 2020-12-17 RX ORDER — POTASSIUM CHLORIDE 20 MEQ/1
40 TABLET, EXTENDED RELEASE ORAL 2 TIMES DAILY
Status: DISCONTINUED | OUTPATIENT
Start: 2020-12-17 | End: 2020-12-18

## 2020-12-17 RX ORDER — SAME BUTANEDISULFONATE/BETAINE 400-600 MG
500 POWDER IN PACKET (EA) ORAL 2 TIMES DAILY
Status: DISCONTINUED | OUTPATIENT
Start: 2020-12-17 | End: 2020-12-22 | Stop reason: HOSPADM

## 2020-12-17 RX ADMIN — CIPROFLOXACIN 400 MG: 2 INJECTION, SOLUTION INTRAVENOUS at 10:25

## 2020-12-17 RX ADMIN — CIPROFLOXACIN 400 MG: 2 INJECTION, SOLUTION INTRAVENOUS at 21:00

## 2020-12-17 RX ADMIN — POTASSIUM CHLORIDE 20 MEQ: 200 INJECTION, SOLUTION INTRAVENOUS at 00:41

## 2020-12-17 RX ADMIN — SODIUM CHLORIDE 30 ML: 900 INJECTION, SOLUTION INTRAVENOUS at 11:20

## 2020-12-17 RX ADMIN — REMDESIVIR 100 MG: 100 INJECTION, POWDER, LYOPHILIZED, FOR SOLUTION INTRAVENOUS at 10:36

## 2020-12-17 RX ADMIN — Medication 5 ML: at 14:11

## 2020-12-17 RX ADMIN — ENOXAPARIN SODIUM 30 MG: 30 INJECTION SUBCUTANEOUS at 21:51

## 2020-12-17 RX ADMIN — ENOXAPARIN SODIUM 30 MG: 30 INJECTION SUBCUTANEOUS at 10:25

## 2020-12-17 RX ADMIN — Medication 5 ML: at 05:52

## 2020-12-17 RX ADMIN — Medication 10 ML: at 21:55

## 2020-12-17 NOTE — PROGRESS NOTES
LTG: Patient will tolerate least restrictive diet without overt signs or symptoms of airway compromise. STG: Patient will tolerate pureed diet and thin liquids without overt signs or symptoms of airway compromise. Discontinued 12/16/2020  STG: patient will participate in ongoing po trials in efforts to advance diet. STG: Patient will participate in modified barium swallow study as clinically indicated. SPEECH LANGUAGE PATHOLOGY: DYSPHAGIA- Daily Note 2    NAME/AGE/GENDER: Bhumi Zuniga is a 65 y.o. female  DATE: 12/17/2020  PRIMARY DIAGNOSIS: Urinary tract infection [N39.0]  Altered mental status [R41.82]      ICD-10: Treatment Diagnosis: R13.12 Dysphagia, Oropharyngeal Phase    RECOMMENDATIONS   DIET:    NPO     MEDICATIONS: Non-oral      ASPIRATION PRECAUTIONS  · n/a     COMPENSATORY STRATEGIES/MODIFICATIONS  · n/a     RECOMMENDATIONS for CONTINUED SPEECH THERAPY:   YES: Anticipate need for ongoing speech therapy during this hospitalization and at next level of care. ASSESSMENT   Patient presents poor-no purposeful acceptance of trials. Difficult to assess pharyngeal swallow. Unable to drink via straw, which is reportedly how family gets patient to accept po at home. Recommend NPO. meds non oral.  Per RN, patient has either not been alert enough or clinching teeth to refuse oral meds. Will follow up for ongoing po trials. CONTINUATION OF SKILLED SERVICES/MEDICAL NECESSITY:   Patient is expected to demonstrate progress in  swallow strength, swallow timeliness, swallow function, diet tolerance and swallow safety in order to  improve swallow safety, work toward diet advancement and decrease aspiration risk.  Patient continues to require skilled intervention due to dysphagia.    EDUCATION:  · Recommendations discussed with Patient and RN  REHABILITATION POTENTIAL FOR STATED GOALS: Fair    PLAN    FREQUENCY/DURATION: Continue to follow patient 2 times a week for duration of hospital stay to address above goals. - Recommendations for next treatment session: Next treatment will address potrials    SUBJECTIVE   Awake but difficult time maintaining eye opening. Non verbal and no command following. Oxygen Device: room air. Pain: Pain Scale 1: FLACC  Pain Intensity 1: 0    History of Present Injury/Illness: Ms. Stefany Mcguire  has a past medical history of Neurological disorder. . She also  has a past surgical history that includes hx hysterectomy. PRECAUTIONS/ALLERGIES: Amoxicillin     Problem List:  (Impairments causing functional limitations):  1. Oral dysphagia    Orientation:  did not respond; non verbal       OBJECTIVE   Swallow treatment-po trials  Patient with audible breathing through nose upon entry on room air, and appears have abnormal pattern at times. Notified RN. Minimal oral opening for acceptance of ice chips with patient minimally moving lips. Placed ice chips in anterior oral cavity. Slowed, prolonged manipulation with episodes of prolonged holding and appears to eventually demonstrate piecemeal swallow. Elvis deferred this date due to impaired acceptance and oral impairment. Unable to siphon via straw as patient would only slightly open lips but no initiation to seal lips around straw.          Tool Used: Dysphagia Outcome and Severity Scale (JOANNA)    Score Comments   Normal Diet  [] 7 With no strategies or extra time needed   Functional Swallow  [] 6 May have mild oral or pharyngeal delay   Mild Dysphagia  [] 5 Which may require one diet consistency restricted    Mild-Moderate Dysphagia  [] 4 With 1-2 diet consistencies restricted   Moderate Dysphagia  [] 3 With 2 or more diet consistencies restricted   Moderate-Severe Dysphagia  [] 2 With partial PO strategies (trials with ST only)   Severe Dysphagia  [] 1 With inability to tolerate any PO safely      Score:  Initial:3 Most Recent: 2 (Date 12/17/20 )   Interpretation of Tool: The Dysphagia Outcome and Severity Scale (JOANNA) is a simple, easy-to-use, 7-point scale developed to systematically rate the functional severity of dysphagia based on objective assessment and make recommendations for diet level, independence level, and type of nutrition.          INTERDISCIPLINARY COLLABORATION: RN    After treatment position/precautions:  · Upright in bed  · RN notified    Total Treatment Duration:   Time In: 1101  Time Out: 47 [facility], [facility], [address] [facility] [place]

## 2020-12-17 NOTE — PROGRESS NOTES
All hourly rounds/assessments completed per this shift. Pt. Does not respond verbally but will open eyes to voice, as this has been her baseline. Pt. Would not wake up well enough to attempt oral meds, IV valproate given per [facility] to replace oral seizure med. Pt. Voiding with pure wick. Heels appear boggy, starting to form blisters, foam drsg and prevalon boots applied. Pt. Turned X2 hours. All needs met at this time. Bed locked/low. Personal belongings within reach. Call light within reach. Bedside shift report will be given to the oncoming nurse.

## 2020-12-17 NOTE — PROGRESS NOTES
Hospitalist Progress Note     Admit Date:  2020  1:24 PM   Name:  Randy Rubin   Age:  65 y.o.  :  1950   MRN:  [id number]   PCP:  Carly Delgadillo PA-C  Treatment Team: Attending Provider: Stacey Devlin MD; Utilization Review: Hiram Key RN; Care Manager: Arron Kerns RN    Subjective:   As per prior documentation:    Aicha Jarvis is a 65 y.o. female with medical history significant for dementia, seizures who was brought in by her son due to decreased mental status labored breathing and decreased p.o. intake. Patient is somnolent and is unable to answer any questions. Her son who is at bedside provided the medical history. Patient normally is ANO x1-2 and able to engage in simple conversation. She is able to walk with assistance. However, in the past 3 days patient has become less active and lethargic with decreased p.o. intake. Per son, this is how she normally gets when she gets UTI. Patient was started on ciprofloxacin when she got 2 doses so far. Patient condition worsened and she has not been able to get up or speak much in the past 2 days. He also noticed that patient was tachypneic and labored breathing over the weekend which got worse last night. Of note patient has a family member who tested positive for Covid last Monday. She does not have any known fevers, chills, nausea, vomiting, abdominal pain or diarrhea.     In the ED, patient is afebrile and hemodynamically stable with saturation above 94% on room air. Blood work work-up is unrevealing. Urine analysis shows cloudy urine with large leukocyte esterase and more than 100 WBC. CXR with minimal infiltrate at lung bases, right greater than left. Procalc of < 0.05. CT head without any acute intracranial abnormalities.     ROS unable to be obtained due to patient's clinical status. \"   She did test positive for COVID-19.       2020  Pt is resting in bed, non verbal and not responding to any commands. She is resting, awake but not responding. ROS: 10 point ROS negative except what mentioned above. Objective:     Patient Vitals for the past 24 hrs:   Temp Pulse Resp BP SpO2   12/17/20 0507 98.6 °F (37 °C) 65 17 (!) 147/85 98 %   12/17/20 0011 98.1 °F (36.7 °C) 89 18 (!) 142/76 96 %   12/16/20 2015 98.7 °F (37.1 °C) 93 18 (!) 157/91 96 %   12/16/20 1657 98.2 °F (36.8 °C) 90 20 (!) 150/80 97 %   12/16/20 1207 98.8 °F (37.1 °C) 94 24 (!) 149/76 93 %     Oxygen Therapy  O2 Sat (%): 98 % (12/17/20 0507)  Pulse via Oximetry: 67 beats per minute (12/15/20 0211)  O2 Device: Room air (12/16/20 1305)    Intake/Output Summary (Last 24 hours) at 12/17/2020 0732  Last data filed at 12/16/2020 1553  Gross per 24 hour   Intake    Output 400 ml   Net -400 ml         General:    Elderly, frail, non verbal, awake  CV:   RRR. No murmur, rub, or gallop. Lungs:   Air entry equal bilaterally with no wheezing, rhonchi, or rales. Abdomen:   Soft, nontender, nondistended. Extremities: Warm and dry. No cyanosis or edema. Skin:     No rashes or jaundice. CNS:               gcs 15, complete exam could not be done as pt is not cooperating  Psych:             Unable to assess    Data Review:  I have reviewed all labs, meds, telemetry events, and studies from the last 24 hours.     Recent Results (from the past 24 hour(s))   CBC WITH AUTOMATED DIFF    Collection Time: 12/16/20  7:51 AM   Result Value Ref Range    WBC 7.6 4.3 - 11.1 K/uL    RBC 4.26 4.05 - 5.2 M/uL    HGB 12.7 11.7 - 15.4 g/dL    HCT 36.7 35.8 - 46.3 %    MCV 86.2 79.6 - 97.8 FL    MCH 29.8 26.1 - 32.9 PG    MCHC 34.6 31.4 - 35.0 g/dL    RDW 13.1 11.9 - 14.6 %    PLATELET 294 963 - 786 K/uL    MPV 8.7 (L) 9.4 - 12.3 FL    ABSOLUTE NRBC 0.00 0.0 - 0.2 K/uL    DF AUTOMATED      NEUTROPHILS 52 43 - 78 %    LYMPHOCYTES 37 13 - 44 %    MONOCYTES 9 4.0 - 12.0 %    EOSINOPHILS 0 (L) 0.5 - 7.8 %    BASOPHILS 0 0.0 - 2.0 %    IMMATURE GRANULOCYTES 1 0.0 - 5.0 % ABS. NEUTROPHILS 3.9 1.7 - 8.2 K/UL    ABS. LYMPHOCYTES 2.8 0.5 - 4.6 K/UL    ABS. MONOCYTES 0.7 0.1 - 1.3 K/UL    ABS. EOSINOPHILS 0.0 0.0 - 0.8 K/UL    ABS. BASOPHILS 0.0 0.0 - 0.2 K/UL    ABS. IMM.  GRANS. 0.1 0.0 - 0.5 K/UL   METABOLIC PANEL, BASIC    Collection Time: 12/16/20  7:51 AM   Result Value Ref Range    Sodium 143 136 - 145 mmol/L    Potassium 2.9 (L) 3.5 - 5.1 mmol/L    Chloride 110 (H) 98 - 107 mmol/L    CO2 25 21 - 32 mmol/L    Anion gap 8 7 - 16 mmol/L    Glucose 82 65 - 100 mg/dL    BUN 13 8 - 23 MG/DL    Creatinine 0.47 (L) 0.6 - 1.0 MG/DL    GFR est AA >60 >60 ml/min/1.73m2    GFR est non-AA >60 >60 ml/min/1.73m2    Calcium 8.5 8.3 - 27.0 MG/DL   METABOLIC PANEL, BASIC    Collection Time: 12/17/20  3:50 AM   Result Value Ref Range    Sodium 142 136 - 145 mmol/L    Potassium 3.3 (L) 3.5 - 5.1 mmol/L    Chloride 109 (H) 98 - 107 mmol/L    CO2 26 21 - 32 mmol/L    Anion gap 7 7 - 16 mmol/L    Glucose 80 65 - 100 mg/dL    BUN 13 8 - 23 MG/DL    Creatinine 0.43 (L) 0.6 - 1.0 MG/DL    GFR est AA >60 >60 ml/min/1.73m2    GFR est non-AA >60 >60 ml/min/1.73m2    Calcium 8.6 8.3 - 10.4 MG/DL   CBC W/O DIFF    Collection Time: 12/17/20  3:50 AM   Result Value Ref Range    WBC 9.7 4.3 - 11.1 K/uL    RBC 4.07 4.05 - 5.2 M/uL    HGB 11.9 11.7 - 15.4 g/dL    HCT 35.4 (L) 35.8 - 46.3 %    MCV 87.0 79.6 - 97.8 FL    MCH 29.2 26.1 - 32.9 PG    MCHC 33.6 31.4 - 35.0 g/dL    RDW 13.2 11.9 - 14.6 %    PLATELET 703 400 - 722 K/uL    MPV 8.8 (L) 9.4 - 12.3 FL    ABSOLUTE NRBC 0.00 0.0 - 0.2 K/uL   MAGNESIUM    Collection Time: 12/17/20  3:50 AM   Result Value Ref Range    Magnesium 2.0 1.8 - 2.4 mg/dL        All Micro Results     Procedure Component Value Units Date/Time    CULTURE, URINE [[id number]]  (Abnormal) Collected: 12/14/20 1352    Order Status: Completed Specimen: Urine from Clean catch Updated: 12/17/20 0719     Special Requests: NO SPECIAL REQUESTS        Culture result:       50,000-100,000 COLONIES/mL PRESUMPTIVE ENTEROCOCCUS SPECIES IDENTIFICATION AND SUSCEPTIBILITY TO FOLLOW                  <10,000  COLONIES/mL  NORMAL SKIN ALONSO ISOLATED      CULTURE, BLOOD [[id number]] Collected: 12/15/20 0654    Order Status: Completed Specimen: Blood Updated: 12/16/20 1126     Special Requests: --        LEFT  HAND       Culture result: NO GROWTH 1 DAY       CULTURE, BLOOD [[id number]] Collected: 12/14/20 1235    Order Status: Completed Specimen: Blood Updated: 12/16/20 0716     Special Requests: --        LEFT  HAND       Culture result: NO GROWTH 2 DAYS             Current Meds:  Current Facility-Administered Medications   Medication Dose Route Frequency    potassium chloride (K-DUR, KLOR-CON) SR tablet 40 mEq  40 mEq Oral BID    ciprofloxacin (CIPRO) 400 mg in D5W IVPB (premix)  400 mg IntraVENous Q12H    metoprolol (LOPRESSOR) injection 5 mg  5 mg IntraVENous Q6H PRN    metoprolol tartrate (LOPRESSOR) tablet 12.5 mg  12.5 mg Oral Q12H    enoxaparin (LOVENOX) injection 30 mg  30 mg SubCUTAneous Q12H    dexAMETHasone (DECADRON) tablet 6 mg  6 mg Oral DAILY    cholecalciferol (VITAMIN D3) (1000 Units /25 mcg) tablet 2 Tab  2,000 Units Oral DAILY    remdesivir 100 mg in 0.9% sodium chloride 250 mL IVPB  100 mg IntraVENous Q24H    0.9% sodium chloride infusion for Remdesivir  30 mL IntraVENous Q24H    sodium chloride (NS) flush 5-40 mL  5-40 mL IntraVENous Q8H    sodium chloride (NS) flush 5-40 mL  5-40 mL IntraVENous PRN    acetaminophen (TYLENOL) tablet 650 mg  650 mg Oral Q6H PRN    Or    acetaminophen (TYLENOL) suppository 650 mg  650 mg Rectal Q6H PRN    polyethylene glycol (MIRALAX) packet 17 g  17 g Oral DAILY PRN    promethazine (PHENERGAN) tablet 12.5 mg  12.5 mg Oral Q6H PRN    Or    ondansetron (ZOFRAN) injection 4 mg  4 mg IntraVENous Q6H PRN    divalproex (DEPAKOTE SPRINKLE) capsule 250 mg  250 mg Oral TID    cefTRIAXone (ROCEPHIN) 1 g in 0.9% sodium chloride (MBP/ADV) 50 mL MBP  1 g IntraVENous Q24H       Other Studies (last 24 hours):  No results found. Assessment and Plan:     Hospital Problems as of 12/17/2020 Date Reviewed: 9/24/2019          Codes Class Noted - Resolved POA    COVID-19 ICD-10-CM: U07.1  ICD-9-CM: 079.89  12/15/2020 - Present Yes        Urinary tract infection ICD-10-CM: N39.0  ICD-9-CM: 599.0  12/14/2020 - Present Unknown        Seizure disorder ([facility] [address] ICD-10-CM: G40.909  ICD-9-CM: 345.90  12/14/2020 - Present Unknown        Dementia ([facility] [address] ICD-10-CM: F03.90  ICD-9-CM: 294.20  12/14/2020 - Present Unknown        Exposure to COVID-19 virus ICD-10-CM: Z20.828  ICD-9-CM: V01.79  12/14/2020 - Present Unknown        * (Principal) Encephalopathy, metabolic IHZ-27-QB: O24.62  ICD-9-CM: 348.31  12/14/2020 - Present Unknown              PLAN:    · Acute metabolic encephalopathy likely secondary to UTI and possibly COVID-19 infectionwe will continue IV antibiotics-review of old cultures reveal her Enterococcus and prior E. coli was sensitive to ciprofloxacin, will continue monotherapy. · I am not sure why pt was not given plasma. She is on remdesivir. She is on room air  · Dementiacontinue supportive care. She is able to have simple conversations intermittently at baseline per reports from family. At this time she does not volunteer much conversation we will continue to treat her underlying conditions and monitor. · Pt is a strict NPO, speech is on board. Will get CT head to rule out any worsening or acute pathology  · Seizure disorder continue the Depakote      DC planning/Dispo: Home with family when medically stable. At this time they provide the care that she needs and do not want her placed in a nursing facility. They also do not want home health services.     DVT ppx: Lovenox    Signed:  Oleg Barnett MD

## 2020-12-17 NOTE — PROGRESS NOTES
Pt has not taken in anything PO this shift. NO meds, no food. Son called this shift (code verified) and stated \"mom does that sometimes, she's really stubborn and she'll come out of it soon\". MD was made aware of no med intake. Pt has remained hypertensive this shift.    Report given to Rachel Cm

## 2020-12-17 NOTE — PROGRESS NOTES
Pt refused all PO meds. Attempted bite of applesauce first but will not open mouth. Clenched jaw. Notified MD. Brittanie Valdez all meds and completed in pyxis.

## 2020-12-18 ENCOUNTER — APPOINTMENT (OUTPATIENT)
Dept: GENERAL RADIOLOGY | Age: 70
DRG: 177 | End: 2020-12-18
Attending: INTERNAL MEDICINE
Payer: MEDICARE

## 2020-12-18 LAB
ABO + RH BLD: NORMAL
ANION GAP SERPL CALC-SCNC: 8 MMOL/L (ref 7–16)
BACTERIA SPEC CULT: ABNORMAL
BACTERIA SPEC CULT: ABNORMAL
BUN SERPL-MCNC: 14 MG/DL (ref 8–23)
CALCIUM SERPL-MCNC: 9 MG/DL (ref 8.3–10.4)
CHLORIDE SERPL-SCNC: 106 MMOL/L (ref 98–107)
CO2 SERPL-SCNC: 27 MMOL/L (ref 21–32)
CREAT SERPL-MCNC: 0.45 MG/DL (ref 0.6–1)
ERYTHROCYTE [DISTWIDTH] IN BLOOD BY AUTOMATED COUNT: 13.5 % (ref 11.9–14.6)
GLUCOSE SERPL-MCNC: 97 MG/DL (ref 65–100)
HCT VFR BLD AUTO: 40.4 % (ref 35.8–46.3)
HGB BLD-MCNC: 13.2 G/DL (ref 11.7–15.4)
MCH RBC QN AUTO: 29 PG (ref 26.1–32.9)
MCHC RBC AUTO-ENTMCNC: 32.7 G/DL (ref 31.4–35)
MCV RBC AUTO: 88.8 FL (ref 79.6–97.8)
NRBC # BLD: 0 K/UL (ref 0–0.2)
PLATELET # BLD AUTO: 364 K/UL (ref 150–450)
PMV BLD AUTO: 8.9 FL (ref 9.4–12.3)
POTASSIUM SERPL-SCNC: 3.1 MMOL/L (ref 3.5–5.1)
RBC # BLD AUTO: 4.55 M/UL (ref 4.05–5.2)
SERVICE CMNT-IMP: ABNORMAL
SODIUM SERPL-SCNC: 141 MMOL/L (ref 136–145)
WBC # BLD AUTO: 10.9 K/UL (ref 4.3–11.1)

## 2020-12-18 PROCEDURE — 74011000250 HC RX REV CODE- 250: Performed by: INTERNAL MEDICINE

## 2020-12-18 PROCEDURE — 74011000258 HC RX REV CODE- 258: Performed by: INTERNAL MEDICINE

## 2020-12-18 PROCEDURE — 74011000302 HC RX REV CODE- 302: Performed by: INTERNAL MEDICINE

## 2020-12-18 PROCEDURE — 36430 TRANSFUSION BLD/BLD COMPNT: CPT

## 2020-12-18 PROCEDURE — 86580 TB INTRADERMAL TEST: CPT | Performed by: INTERNAL MEDICINE

## 2020-12-18 PROCEDURE — 74011250636 HC RX REV CODE- 250/636: Performed by: INTERNAL MEDICINE

## 2020-12-18 PROCEDURE — 85027 COMPLETE CBC AUTOMATED: CPT

## 2020-12-18 PROCEDURE — 65270000029 HC RM PRIVATE

## 2020-12-18 PROCEDURE — 97530 THERAPEUTIC ACTIVITIES: CPT

## 2020-12-18 PROCEDURE — 86900 BLOOD TYPING SEROLOGIC ABO: CPT

## 2020-12-18 PROCEDURE — 71045 X-RAY EXAM CHEST 1 VIEW: CPT

## 2020-12-18 PROCEDURE — 2709999900 HC NON-CHARGEABLE SUPPLY

## 2020-12-18 PROCEDURE — 80048 BASIC METABOLIC PNL TOTAL CA: CPT

## 2020-12-18 PROCEDURE — XW13325 TRANSFUSION OF CONVALESCENT PLASMA (NONAUTOLOGOUS) INTO PERIPHERAL VEIN, PERCUTANEOUS APPROACH, NEW TECHNOLOGY GROUP 5: ICD-10-PCS | Performed by: INTERNAL MEDICINE

## 2020-12-18 RX ORDER — SODIUM CHLORIDE 9 MG/ML
250 INJECTION, SOLUTION INTRAVENOUS AS NEEDED
Status: DISCONTINUED | OUTPATIENT
Start: 2020-12-18 | End: 2020-12-22 | Stop reason: HOSPADM

## 2020-12-18 RX ORDER — POTASSIUM CHLORIDE 14.9 MG/ML
20 INJECTION INTRAVENOUS
Status: COMPLETED | OUTPATIENT
Start: 2020-12-18 | End: 2020-12-18

## 2020-12-18 RX ADMIN — Medication 5 ML: at 13:46

## 2020-12-18 RX ADMIN — ENOXAPARIN SODIUM 30 MG: 30 INJECTION SUBCUTANEOUS at 20:00

## 2020-12-18 RX ADMIN — SODIUM CHLORIDE 30 ML: 900 INJECTION, SOLUTION INTRAVENOUS at 10:43

## 2020-12-18 RX ADMIN — CIPROFLOXACIN 400 MG: 2 INJECTION, SOLUTION INTRAVENOUS at 08:13

## 2020-12-18 RX ADMIN — Medication 10 ML: at 06:00

## 2020-12-18 RX ADMIN — POTASSIUM CHLORIDE 20 MEQ: 200 INJECTION, SOLUTION INTRAVENOUS at 13:46

## 2020-12-18 RX ADMIN — POTASSIUM CHLORIDE 20 MEQ: 200 INJECTION, SOLUTION INTRAVENOUS at 15:40

## 2020-12-18 RX ADMIN — VANCOMYCIN HYDROCHLORIDE 1000 MG: 1 INJECTION, POWDER, LYOPHILIZED, FOR SOLUTION INTRAVENOUS at 17:44

## 2020-12-18 RX ADMIN — REMDESIVIR 100 MG: 100 INJECTION, POWDER, LYOPHILIZED, FOR SOLUTION INTRAVENOUS at 10:43

## 2020-12-18 RX ADMIN — ENOXAPARIN SODIUM 30 MG: 30 INJECTION SUBCUTANEOUS at 08:12

## 2020-12-18 RX ADMIN — TUBERCULIN PURIFIED PROTEIN DERIVATIVE 5 UNITS: 5 INJECTION, SOLUTION INTRADERMAL at 08:13

## 2020-12-18 NOTE — PROGRESS NOTES
Pt laying In bed she will open her eyes when her name is called. Pt still not making any verbal intent. Pt did squeeze my hands when asked. Pts saturation within normal limits but pt has some labored breathing, RR 28. Pt does not appear to be in any pain. Safety measures in place.

## 2020-12-18 NOTE — PROGRESS NOTES
Pt has slept most of this shift. Pt does not appear to be in any pain. Pt continues to have tachypnea. 2L Nasal Cannula applied. Pt still not responding, but she will open her eyes. Will continue to monitor pt. Safety measures in place. Preparing to give report to oncoming shift.

## 2020-12-18 NOTE — PROGRESS NOTES
Attempted to call pts son Lindsay Yuan to get consent for convalescent plasma, no answer left a voicemail. [address] returned phone call  Verbal consent given. Consent witness by Alfa Rey.

## 2020-12-18 NOTE — PROGRESS NOTES
ACUTE PHYSICAL THERAPY GOALS:  (Developed with and agreed upon by patient and/or caregiver.)  (1.) Lv Mccabe will move from supine to sit and sit to supine , scoot up and down and roll side to side with CONTACT GUARD ASSIST within 7 treatment day(s). (2.) Lv Mccabe will transfer from bed to chair and chair to bed with CONTACT GUARD ASSIST using the least restrictive device within 7 treatment day(s). (3.) Lv Mccabe will perform seated static and dynamic balance activities x 20 minutes with STAND BY ASSIST to improve safety within 7 treatment day(s). (4.) Lv Mccabe will perform standing static and dynamic balance activities x 10 minutes with MINIMAL ASSIST to improve safety within 7 treatment day(s). PHYSICAL THERAPY: Daily Note and AM Treatment Day # 2    Lv Mccabe is a 65 y.o. female   PRIMARY DIAGNOSIS: Encephalopathy, metabolic  Urinary tract infection [N39.0]  Altered mental status [R41.82]         ASSESSMENT:     REHAB RECOMMENDATIONS: CURRENT LEVEL OF FUNCTION:  (Most Recently Demonstrated)   Recommendation to date pending progress:  Setting:   Short-term Rehab  Equipment:    To Be Determined Bed Mobility:   x 2 max-total assist  Sit to Stand:   Not tested  Transfers:   Not tested  Gait/Mobility:   Not tested     ASSESSMENT:  Ms. Dale Damon is progressing slowly towards PT goals. Patient was nonverbal throughout treatment today with decreased/delayed command following. Patient continues to be max-total assist x 2 for bed mobility. Will continue PT efforts. SUBJECTIVE:   Ms. Dale Damon states, Sumaya Leonel. \"    SOCIAL HISTORY/ LIVING ENVIRONMENT:      OBJECTIVE:     PAIN: VITAL SIGNS: LINES/DRAINS:   Pre Treatment: Pain Screen  Pain Scale 1: FLACC  Pain Intensity 1: 0  Post Treatment: NOne   None  O2 Device: Room air     MOBILITY: I Mod I S SBA CGA Min Mod Max Total  NT x2 Comments:   Bed Mobility    Rolling [] [] [] [] [] [] [] [] [] [] []    Supine to Sit [] [] [] [] [] [] [] [x] [x] [] [x] Scooting [] [] [] [] [] [] [] [] [] [] []    Sit to Supine [] [] [] [] [] [] [] [x] [x] [] [x]    Transfers    Sit to Stand [] [] [] [] [] [] [] [] [] [x] []    Bed to Chair [] [] [] [] [] [] [] [] [] [x] []    Stand to Sit [] [] [] [] [] [] [] [] [] [x] []    I=Independent, Mod I=Modified Independent, S=Supervision, SBA=Standby Assistance, CGA=Contact Guard Assistance,   Min=Minimal Assistance, Mod=Moderate Assistance, Max=Maximal Assistance, Total=Total Assistance, NT=Not Tested    GAIT: I Mod I S SBA CGA Min Mod Max Total  NT x2 Comments:   Level of Assistance [] [] [] [] [] [] [] [] [] [] []    Distance     DME N/A    Gait Quality     I=Independent, Mod I=Modified Independent, S=Supervision, SBA=Standby Assistance, CGA=Contact Guard Assistance,   Min=Minimal Assistance, Mod=Moderate Assistance, Max=Maximal Assistance, Total=Total Assistance, NT=Not Tested    PLAN:   FREQUENCY/DURATION: PT Plan of Care: 3 times/week(trial basis) for duration of hospital stay or until stated goals are met, whichever comes first.  TREATMENT:     TREATMENT:   ($$ Therapeutic Activity: 23-37 mins    )  Therapeutic Activity (23 Minutes): Therapeutic activity included Supine to Sit, Sit to Supine, Scooting and Sitting balance  to improve functional Mobility, Strength and Activity tolerance.     AFTER TREATMENT POSITION/PRECAUTIONS:  Bed, Needs within reach and RN notified    INTERDISCIPLINARY COLLABORATION:  RN/PCT    TOTAL TREATMENT DURATION:  PT Patient Time In/Time Out  Time In: 6007  Time Out: 1455 [facility]

## 2020-12-18 NOTE — PROGRESS NOTES
Comprehensive Nutrition Assessment    Type and Reason for Visit: Initial, NPO/clear liquid    Nutrition Recommendations/Plan:    Continue NPO per SLP. If unable to initiate oral nutrition regimen within 5 days recommend pursue NGFT for primary nutrition if appropriate with goals of care.  Measured weight ordered. Nutritional Supplement Therapy: Electrolyte replacement per nutritional support protocols are active on MAR.  K replacement IV per protocol as pt is NPO. Malnutrition Assessment:  Malnutrition Status: Insufficient data(current wt does not include a source, acute decline in po)    Nutrition Assessment:   Nutrition History: From home with son, decreased po intake times 3 days PTA reported. Nutrition Background: medical history significant for dementia, seizures. Admitted with acute metabolic encephalopathy likely secondary to UTI and possibly Covid 19 infection. Daily Update:  Pt is nonverbal, alert per staff but not responding to commands. MD expresses concern for poor tolerance of NGT secondary dementia. It is reasonable to await NGFT implementation at day # 3 of minimal intake, now NPO. Abdominal Status (last documented): Flat, Semi-soft abdomen with Hypoactive  bowel sounds. Last BM (unknown). Pertinent Medications: oral meds held  IVF: none  Pertinent Labs: K 3.1, glu 97, Cr 0.45 potentially consistent with poor nutritional intake     Nutrition Related Findings:   NPO per SLP eval 12/17 with no purposeful acceptance of trials noted. NFPE deferred due to isolation.        Current Nutrition Therapies:  DIET NPO    Current Intake:   Average Meal Intake: NPO        Anthropometric Measures:  Height: 5' 5\" (165.1 cm)  Current Body Wt: 65.3 kg (143 lb 15.4 oz)(12/14), Weight source: Not specified  BMI: 24, Normal weight (BMI 22.0-24.9) age over 72  Admission Body Weight: 143 lb 15.4 oz(no source)  Ideal Body Wt: 125 lbs (57 kg), 115.2 %  Usual Body Wt: 77.1 kg (169 lb 15.6 oz)(per EMR FP office visit 1/31/2020), Potential Percent weight change: -15.3 unable to validate as current weight is not measured. Estimated Daily Nutrient Needs:  Energy (kcal/day): 9307-0032 (Kcal/kg(20-25), Weight Used: Admission(65.3 kg))  Protein (g/day): 65-78 (1-1.2 g/kg) Weight Used: (Admission)  Fluid (ml/day):   (1 ml/kcal)    Nutrition Diagnosis:   · Inadequate oral intake related to swallowing difficulty as evidenced by (NPO day 1 s/p SLP malial, prior puree w minimal intake)    Nutrition Interventions:   Food and/or Nutrient Delivery: Continue NPO     Coordination of Nutrition Care: Continue to monitor while inpatient  Plan of Care discussed with Dr. King Young. Goals: Active Goal: Tolerate initiation of nutrition regimen within 5 days    Nutrition Monitoring and Evaluation:      Food/Nutrient Intake Outcomes: Diet advancement/tolerance  Physical Signs/Symptoms Outcomes: Biochemical data, Chewing or swallowing, GI status    Discharge Planning:     Too soon to determine    Giorgio Stiles RD, LDN on 12/18/2020 at 12:00 PM  Contact: [phone]       Disaster Mode active

## 2020-12-18 NOTE — PROGRESS NOTES
SPEECH PATHOLOGY NOTE:    Per RN, patient has been minimally alert today. Not appropriate for po trials at this time due to mentation. Will check back at later time/date as patient is able to participate and as schedule permits.       Cass Osler MS, CCC-SLP

## 2020-12-18 NOTE — PROGRESS NOTES
Hospitalist Progress Note     Admit Date:  2020  1:24 PM   Name:  Amish Ybarra   Age:  65 y.o.  :  1950   MRN:  [id number]   PCP:  Asrtid Gutierrez PA-C  Treatment Team: Attending Provider: Rodger Platt MD; Utilization Review: Re Weston RN; Care Manager: Nikole Peterson RN; Primary Nurse: Jerson Cassidy    Subjective:   As per prior documentation:    \"Luda Houser is a 65 y.o. female with medical history significant for dementia, seizures who was brought in by her son due to decreased mental status labored breathing and decreased p.o. intake. Patient is somnolent and is unable to answer any questions. Her son who is at bedside provided the medical history. Patient normally is ANO x1-2 and able to engage in simple conversation. She is able to walk with assistance. However, in the past 3 days patient has become less active and lethargic with decreased p.o. intake. Per son, this is how she normally gets when she gets UTI. Patient was started on ciprofloxacin when she got 2 doses so far. Patient condition worsened and she has not been able to get up or speak much in the past 2 days. He also noticed that patient was tachypneic and labored breathing over the weekend which got worse last night. Of note patient has a family member who tested positive for Covid last Monday. She does not have any known fevers, chills, nausea, vomiting, abdominal pain or diarrhea.     In the ED, patient is afebrile and hemodynamically stable with saturation above 94% on room air. Blood work work-up is unrevealing. Urine analysis shows cloudy urine with large leukocyte esterase and more than 100 WBC. CXR with minimal infiltrate at lung bases, right greater than left. Procalc of < 0.05. CT head without any acute intracranial abnormalities.     ROS unable to be obtained due to patient's clinical status. \"       December 15, 2020patient seen and evaluated.   Resting comfortably and not very interactive or cooperative. Hard of hearing but eventually smiles for me after repeatedly being asked. She is unable to give a history secondary to her underlying dementia. She did test positive for COVID-19. December 16, 2020patient seen and evaluated. Resting comfortably. Not very interactive or cooperative today. She does track me in the room. Again unable to give a history secondary to her underlying dementia per family this is her baseline and the patient has probably not walked for several weeks. She does get this way when she has a urinary tract infection. It appears that she has had at least 3 of them this year. Per her son he has been blending food for her and letting her suck it up in a straw. She barely responds to them and does not like healthcare facilities. December 17, 2020patient seen and evaluated. She is resting again not very interactive or cooperative but does track me in the room. Speech has kept her n.p.o. Objective:     Patient Vitals for the past 24 hrs:   Temp Pulse Resp BP SpO2   12/18/20 0604 99.4 °F (37.4 °C) 96 24 (!) 153/86 96 %   12/17/20 2347 100.3 °F (37.9 °C) 95 28 (!) 142/85 95 %   12/17/20 1946 97.4 °F (36.3 °C) 90 18 (!) 147/77 98 %   12/17/20 1522 98.9 °F (37.2 °C) 84 18 (!) 147/73 94 %   12/17/20 1142 97.6 °F (36.4 °C) 95 18 (!) 147/81 94 %   12/17/20 0750 98.2 °F (36.8 °C) 96 18 (!) 160/83 96 %     Oxygen Therapy  O2 Sat (%): 96 % (12/18/20 0604)  Pulse via Oximetry: 67 beats per minute (12/15/20 0211)  O2 Device: Room air (12/17/20 0803)    Intake/Output Summary (Last 24 hours) at 12/18/2020 0730  Last data filed at 12/18/2020 0604  Gross per 24 hour   Intake    Output 350 ml   Net -350 ml         General:    Thin frail and resting but easily arousable alert. CV:   RRR. No murmur, rub, or gallop. Lungs:   Air entry equal bilaterally with no wheezing, rhonchi, or rales. Abdomen:   Soft, nontender, nondistended. Extremities: Warm and dry.   No cyanosis or edema. Skin:     No rashes or jaundice. Data Review:  I have reviewed all labs, meds, telemetry events, and studies from the last 24 hours.     Recent Results (from the past 24 hour(s))   METABOLIC PANEL, BASIC    Collection Time: 12/18/20  4:53 AM   Result Value Ref Range    Sodium 141 136 - 145 mmol/L    Potassium 3.1 (L) 3.5 - 5.1 mmol/L    Chloride 106 98 - 107 mmol/L    CO2 27 21 - 32 mmol/L    Anion gap 8 7 - 16 mmol/L    Glucose 97 65 - 100 mg/dL    BUN 14 8 - 23 MG/DL    Creatinine 0.45 (L) 0.6 - 1.0 MG/DL    GFR est AA >60 >60 ml/min/1.73m2    GFR est non-AA >60 >60 ml/min/1.73m2    Calcium 9.0 8.3 - 10.4 MG/DL   CBC W/O DIFF    Collection Time: 12/18/20  4:53 AM   Result Value Ref Range    WBC 10.9 4.3 - 11.1 K/uL    RBC 4.55 4.05 - 5.2 M/uL    HGB 13.2 11.7 - 15.4 g/dL    HCT 40.4 35.8 - 46.3 %    MCV 88.8 79.6 - 97.8 FL    MCH 29.0 26.1 - 32.9 PG    MCHC 32.7 31.4 - 35.0 g/dL    RDW 13.5 11.9 - 14.6 %    PLATELET 579 992 - 091 K/uL    MPV 8.9 (L) 9.4 - 12.3 FL    ABSOLUTE NRBC 0.00 0.0 - 0.2 K/uL        All Micro Results     Procedure Component Value Units Date/Time    CULTURE, URINE [[id number]]  (Abnormal)  (Susceptibility) Collected: 12/14/20 1352    Order Status: Completed Specimen: Urine from Clean catch Updated: 12/18/20 0608     Special Requests: NO SPECIAL REQUESTS        Culture result:       50,000-100,000 COLONIES/mL ENTEROCOCCUS FAECALIS GROUP D                  <1,000 CFU/ML NORMAL SKIN ALONSO ISOLATED          CULTURE, BLOOD [[id number]] Collected: 12/15/20 0635    Order Status: Completed Specimen: Blood Updated: 12/17/20 1126     Special Requests: --        LEFT  HAND       Culture result: NO GROWTH 2 DAYS       CULTURE, BLOOD [[id number]] Collected: 12/14/20 1235    Order Status: Completed Specimen: Blood Updated: 12/17/20 1126     Special Requests: --        LEFT  HAND       Culture result: NO GROWTH 3 DAYS             Current Meds:  Current Facility-Administered Medications Medication Dose Route Frequency    potassium chloride (K-DUR, KLOR-CON) SR tablet 40 mEq  40 mEq Oral BID    Saccharomyces boulardii (FLORASTOR) capsule 500 mg  500 mg Oral BID    ciprofloxacin (CIPRO) 400 mg in D5W IVPB (premix)  400 mg IntraVENous Q12H    metoprolol (LOPRESSOR) injection 5 mg  5 mg IntraVENous Q6H PRN    metoprolol tartrate (LOPRESSOR) tablet 12.5 mg  12.5 mg Oral Q12H    enoxaparin (LOVENOX) injection 30 mg  30 mg SubCUTAneous Q12H    dexAMETHasone (DECADRON) tablet 6 mg  6 mg Oral DAILY    cholecalciferol (VITAMIN D3) (1000 Units /25 mcg) tablet 2 Tab  2,000 Units Oral DAILY    remdesivir 100 mg in 0.9% sodium chloride 250 mL IVPB  100 mg IntraVENous Q24H    0.9% sodium chloride infusion for Remdesivir  30 mL IntraVENous Q24H    sodium chloride (NS) flush 5-40 mL  5-40 mL IntraVENous Q8H    sodium chloride (NS) flush 5-40 mL  5-40 mL IntraVENous PRN    acetaminophen (TYLENOL) tablet 650 mg  650 mg Oral Q6H PRN    Or    acetaminophen (TYLENOL) suppository 650 mg  650 mg Rectal Q6H PRN    polyethylene glycol (MIRALAX) packet 17 g  17 g Oral DAILY PRN    promethazine (PHENERGAN) tablet 12.5 mg  12.5 mg Oral Q6H PRN    Or    ondansetron (ZOFRAN) injection 4 mg  4 mg IntraVENous Q6H PRN    divalproex (DEPAKOTE SPRINKLE) capsule 250 mg  250 mg Oral TID       Other Studies (last 24 hours):  No results found.     Assessment and Plan:     Hospital Problems as of 12/18/2020 Date Reviewed: 9/24/2019          Codes Class Noted - Resolved POA    Hypokalemia ICD-10-CM: E87.6  ICD-9-CM: 276.8  12/17/2020 - Present Unknown        COVID-19 ICD-10-CM: U07.1  ICD-9-CM: 079.89  12/15/2020 - Present Yes        Urinary tract infection ICD-10-CM: N39.0  ICD-9-CM: 599.0  12/14/2020 - Present Unknown        Seizure disorder ([facility] ICD-10-CM: G40.909  ICD-9-CM: 345.90  12/14/2020 - Present Unknown        Dementia ([facility] ICD-10-CM: F03.90  ICD-9-CM: 294.20  12/14/2020 - Present Unknown        Exposure to COVID-19 virus ICD-10-CM: Z20.828  ICD-9-CM: V01.79  12/14/2020 - Present Unknown        * (Principal) Encephalopathy, metabolic LND-78-MISTY: H68.70  ICD-9-CM: 348.31  12/14/2020 - Present Unknown              PLAN:    · Acute metabolic encephalopathy likely secondary to UTI and possibly COVID-19 infection  we will continue IV antibiotics-review of her current cultures reveals Enterococcus sensitive to vancomycin, amoxicillin and penicillin and nitrofurantoin. Unfortunately the patient does not take p.o. and she is allergic to amoxicillin so we will consult the pharmacy about vancomycin. · She previously did not qualify for plasma as she was not in respiratory distress or hypoxic. Today that is changed the patient is notably dyspneic in the room with respirations of 32 on my count-we will place her on supplemental oxygen. And order convalescent plasma  · Dementia  continue supportive care. She is able to have simple conversations intermittently at baseline per reports from family. At this time she does not volunteer much conversation we will continue to treat her underlying conditions and monitor  · Seizure disorder continue the Depakote  · Speech has held her diet today. Possibly can try giving her stuff through a straw tomorrow. We will November:    DC planning/Dispo: Home with family when medically stable. At this time they provide the care that she needs and do not want her placed in a nursing facility. They also do not want home health services.     DVT ppx: Patrick    Signed:  Hernandez Mark MD

## 2020-12-18 NOTE — PROGRESS NOTES
Pharmacokinetic Consult to Pharmacist    Emiliano Khan is a 65 y.o. female being treated for enterococcus UTI. Height: 5' 5\" (165.1 cm)  Weight: 61.5 kg (135 lb 8 oz)  Lab Results   Component Value Date/Time    BUN 14 12/18/2020 04:53 AM    Creatinine 0.45 (L) 12/18/2020 04:53 AM    WBC 10.9 12/18/2020 04:53 AM    Procalcitonin <0.05 12/14/2020 12:35 PM    Lactic acid 1.7 12/14/2020 12:14 PM      Estimated Creatinine Clearance: 65.4 mL/min (A) (by C-G formula based on SCr of 0.45 mg/dL (L)). No results found for: Josie[place]ine [place]    Day 1 of vancomycin. Goal trough is 10-20. I will schedule a dose of 1000 mg every 12 hours. Patient has allergy to amoxicillin. Pharmacist will continue to follow patient and order levels as clinically indicated. Please call with any questions. Thank you,  Peggy Riggins, Pharm. D.   PGY1 Pharmacy Resident  [phone]

## 2020-12-19 LAB
ANION GAP SERPL CALC-SCNC: 7 MMOL/L (ref 7–16)
BACTERIA SPEC CULT: NORMAL
BLD PROD TYP BPU: NORMAL
BPU ID: NORMAL
BUN SERPL-MCNC: 17 MG/DL (ref 8–23)
CALCIUM SERPL-MCNC: 8.3 MG/DL (ref 8.3–10.4)
CHLORIDE SERPL-SCNC: 110 MMOL/L (ref 98–107)
CO2 SERPL-SCNC: 26 MMOL/L (ref 21–32)
CREAT SERPL-MCNC: 0.33 MG/DL (ref 0.6–1)
ERYTHROCYTE [DISTWIDTH] IN BLOOD BY AUTOMATED COUNT: 13.8 % (ref 11.9–14.6)
GLUCOSE SERPL-MCNC: 83 MG/DL (ref 65–100)
HCT VFR BLD AUTO: 33.8 % (ref 35.8–46.3)
HGB BLD-MCNC: 10.9 G/DL (ref 11.7–15.4)
MCH RBC QN AUTO: 29.1 PG (ref 26.1–32.9)
MCHC RBC AUTO-ENTMCNC: 32.2 G/DL (ref 31.4–35)
MCV RBC AUTO: 90.1 FL (ref 79.6–97.8)
MM INDURATION POC: 0 MM (ref 0–5)
NRBC # BLD: 0 K/UL (ref 0–0.2)
PLATELET # BLD AUTO: 320 K/UL (ref 150–450)
PMV BLD AUTO: 8.9 FL (ref 9.4–12.3)
POTASSIUM SERPL-SCNC: 3.1 MMOL/L (ref 3.5–5.1)
PPD POC: NEGATIVE NEGATIVE
RBC # BLD AUTO: 3.75 M/UL (ref 4.05–5.2)
SERVICE CMNT-IMP: NORMAL
SODIUM SERPL-SCNC: 143 MMOL/L (ref 136–145)
STATUS OF UNIT,%ST: NORMAL
UNIT DIVISION, %UDIV: NORMAL
WBC # BLD AUTO: 7.6 K/UL (ref 4.3–11.1)

## 2020-12-19 PROCEDURE — 74011000258 HC RX REV CODE- 258: Performed by: INTERNAL MEDICINE

## 2020-12-19 PROCEDURE — 85027 COMPLETE CBC AUTOMATED: CPT

## 2020-12-19 PROCEDURE — 36415 COLL VENOUS BLD VENIPUNCTURE: CPT

## 2020-12-19 PROCEDURE — 2709999900 HC NON-CHARGEABLE SUPPLY

## 2020-12-19 PROCEDURE — 74011250636 HC RX REV CODE- 250/636: Performed by: INTERNAL MEDICINE

## 2020-12-19 PROCEDURE — 74011000250 HC RX REV CODE- 250: Performed by: INTERNAL MEDICINE

## 2020-12-19 PROCEDURE — 80048 BASIC METABOLIC PNL TOTAL CA: CPT

## 2020-12-19 PROCEDURE — 65270000029 HC RM PRIVATE

## 2020-12-19 RX ADMIN — SODIUM CHLORIDE 30 ML: 900 INJECTION, SOLUTION INTRAVENOUS at 12:53

## 2020-12-19 RX ADMIN — CIPROFLOXACIN 400 MG: 2 INJECTION, SOLUTION INTRAVENOUS at 10:43

## 2020-12-19 RX ADMIN — ENOXAPARIN SODIUM 30 MG: 30 INJECTION SUBCUTANEOUS at 10:44

## 2020-12-19 RX ADMIN — VANCOMYCIN HYDROCHLORIDE 1000 MG: 1 INJECTION, POWDER, LYOPHILIZED, FOR SOLUTION INTRAVENOUS at 18:35

## 2020-12-19 RX ADMIN — Medication 10 ML: at 21:10

## 2020-12-19 RX ADMIN — VANCOMYCIN HYDROCHLORIDE 1000 MG: 1 INJECTION, POWDER, LYOPHILIZED, FOR SOLUTION INTRAVENOUS at 05:03

## 2020-12-19 RX ADMIN — Medication 5 ML: at 17:00

## 2020-12-19 RX ADMIN — Medication 10 ML: at 05:04

## 2020-12-19 RX ADMIN — REMDESIVIR 100 MG: 100 INJECTION, POWDER, LYOPHILIZED, FOR SOLUTION INTRAVENOUS at 12:52

## 2020-12-19 RX ADMIN — ENOXAPARIN SODIUM 30 MG: 30 INJECTION SUBCUTANEOUS at 21:10

## 2020-12-19 RX ADMIN — CIPROFLOXACIN 400 MG: 2 INJECTION, SOLUTION INTRAVENOUS at 21:09

## 2020-12-19 NOTE — PROGRESS NOTES
Bedside report received from night nurse. Assessment done as noted  Respiration even and unlabored 20/min; no s/s of pain or nausea noted at present. Remains afebrile this morning. Non-productive coughing at interval. O2 intact with 2 liters via nasal canula with sats 92-93% at rest.  Remains on Droplet plus isolation for positive COVID-19 screening. Ordered PPE in place and in use. Encouraged to call with needs.

## 2020-12-20 LAB
BACTERIA SPEC CULT: NORMAL
MM INDURATION POC: 0 MM (ref 0–5)
PPD POC: NEGATIVE NEGATIVE
SERVICE CMNT-IMP: NORMAL
VANCOMYCIN TROUGH SERPL-MCNC: 8.9 UG/ML (ref 5–20)

## 2020-12-20 PROCEDURE — 36415 COLL VENOUS BLD VENIPUNCTURE: CPT

## 2020-12-20 PROCEDURE — 80202 ASSAY OF VANCOMYCIN: CPT

## 2020-12-20 PROCEDURE — 65270000029 HC RM PRIVATE

## 2020-12-20 PROCEDURE — 74011250636 HC RX REV CODE- 250/636: Performed by: INTERNAL MEDICINE

## 2020-12-20 PROCEDURE — 2709999900 HC NON-CHARGEABLE SUPPLY

## 2020-12-20 RX ADMIN — Medication 10 ML: at 15:00

## 2020-12-20 RX ADMIN — VANCOMYCIN HYDROCHLORIDE 750 MG: 750 INJECTION, POWDER, LYOPHILIZED, FOR SOLUTION INTRAVENOUS at 14:52

## 2020-12-20 RX ADMIN — VANCOMYCIN HYDROCHLORIDE 750 MG: 750 INJECTION, POWDER, LYOPHILIZED, FOR SOLUTION INTRAVENOUS at 22:00

## 2020-12-20 RX ADMIN — ENOXAPARIN SODIUM 30 MG: 30 INJECTION SUBCUTANEOUS at 20:00

## 2020-12-20 RX ADMIN — VANCOMYCIN HYDROCHLORIDE 1000 MG: 1 INJECTION, POWDER, LYOPHILIZED, FOR SOLUTION INTRAVENOUS at 06:00

## 2020-12-20 RX ADMIN — CIPROFLOXACIN 400 MG: 2 INJECTION, SOLUTION INTRAVENOUS at 09:02

## 2020-12-20 RX ADMIN — Medication 10 ML: at 06:15

## 2020-12-20 RX ADMIN — ENOXAPARIN SODIUM 30 MG: 30 INJECTION SUBCUTANEOUS at 09:02

## 2020-12-20 RX ADMIN — CIPROFLOXACIN 400 MG: 2 INJECTION, SOLUTION INTRAVENOUS at 21:00

## 2020-12-20 NOTE — PROGRESS NOTES
Hospitalist Progress Note     Admit Date:  2020  1:24 PM   Name:  Luiz Kong   Age:  65 y.o.  :  1950   MRN:  [id number]   PCP:  Johann Mckenzie PA-C  Treatment Team: Attending Provider: Hunter Vasquez MD; Utilization Review: Jonathan Rivers RN; Care Manager: Luz Maria Souza RN; Primary Nurse: Flex Bain    Subjective:   As per prior documentation:    \"Luda Burgos is a 65 y.o. female with medical history significant for dementia, seizures who was brought in by her son due to decreased mental status labored breathing and decreased p.o. intake. Patient is somnolent and is unable to answer any questions. Her son who is at bedside provided the medical history. Patient normally is ANO x1-2 and able to engage in simple conversation. She is able to walk with assistance. However, in the past 3 days patient has become less active and lethargic with decreased p.o. intake. Per son, this is how she normally gets when she gets UTI. Patient was started on ciprofloxacin when she got 2 doses so far. Patient condition worsened and she has not been able to get up or speak much in the past 2 days. He also noticed that patient was tachypneic and labored breathing over the weekend which got worse last night. Of note patient has a family member who tested positive for Covid last Monday. She does not have any known fevers, chills, nausea, vomiting, abdominal pain or diarrhea.     In the ED, patient is afebrile and hemodynamically stable with saturation above 94% on room air. Blood work work-up is unrevealing. Urine analysis shows cloudy urine with large leukocyte esterase and more than 100 WBC. CXR with minimal infiltrate at lung bases, right greater than left. Procalc of < 0.05. CT head without any acute intracranial abnormalities.     ROS unable to be obtained due to patient's clinical status. \"       December 15, 2020patient seen and evaluated.   Resting comfortably and not very interactive or cooperative. Hard of hearing but eventually smiles for me after repeatedly being asked. She is unable to give a history secondary to her underlying dementia. She did test positive for COVID-19. December 16, 2020patient seen and evaluated. Resting comfortably. Not very interactive or cooperative today. She does track me in the room. Again unable to give a history secondary to her underlying dementia per family this is her baseline and the patient has probably not walked for several weeks. She does get this way when she has a urinary tract infection. It appears that she has had at least 3 of them this year. Per her son he has been blending food for her and letting her suck it up in a straw. She barely responds to them and does not like healthcare facilities. December 18, 2020patient seen and evaluated. She is resting again not very interactive or cooperative but does track me in the room. Speech has kept her n.p.o.    December 19, 2020patient seen and evaluated. She awakens and smiles initially and then goes back to be unresponsive. Unable to attempt to feed her on my own as her bed does not work and I need to get her up into a sitting position to ensure no aspiration. I have asked nursing to look into getting her bed changed.     Objective:     Patient Vitals for the past 24 hrs:   Temp Pulse Resp BP SpO2   12/20/20 1115 99.1 °F (37.3 °C) 84 19 133/76 97 %   12/20/20 0747 99.3 °F (37.4 °C) 83 18 136/73 96 %   12/20/20 0307 97.7 °F (36.5 °C) 89 18 136/71 95 %   12/19/20 2331 97.9 °F (36.6 °C) 82 18 (!) 142/69 96 %   12/19/20 2025 97.8 °F (36.6 °C) 87 18 (!) 146/70 96 %   12/19/20 1637 97.8 °F (36.6 °C) 87 18 (!) 142/75 98 %     Oxygen Therapy  O2 Sat (%): 97 % (12/20/20 1115)  Pulse via Oximetry: 67 beats per minute (12/15/20 0211)  O2 Device: Nasal cannula (12/20/20 0900)  O2 Flow Rate (L/min): 2 l/min (12/20/20 0900)  No intake or output data in the 24 hours ending 12/20/20 1514      General:    Thin frail and resting but easily arousable alert. CV:   RRR. No murmur, rub, or gallop. Lungs:   Air entry equal bilaterally with no wheezing, rhonchi, or rales. Abdomen:   Soft, nontender, nondistended. Extremities: Warm and dry. No cyanosis or edema. Skin:     No rashes or jaundice. Data Review:  I have reviewed all labs, meds, telemetry events, and studies from the last 24 hours.     Recent Results (from the past 24 hour(s))   VANCOMYCIN, TROUGH    Collection Time: 12/20/20  5:29 AM   Result Value Ref Range    Vancomycin,trough 8.9 5 - 20 ug/mL   PLEASE READ & DOCUMENT PPD TEST IN 48 HRS    Collection Time: 12/20/20 10:43 AM   Result Value Ref Range    PPD Negative Negative    mm Induration 0 0 - 5 mm        All Micro Results     Procedure Component Value Units Date/Time    CULTURE, BLOOD [[id number]] Collected: 12/15/20 0654    Order Status: Completed Specimen: Blood Updated: 12/20/20 0640     Special Requests: --        LEFT  HAND       Culture result: NO GROWTH 5 DAYS       CULTURE, BLOOD [[id number]] Collected: 12/14/20 1235    Order Status: Completed Specimen: Blood Updated: 12/19/20 0837     Special Requests: --        LEFT  HAND       Culture result: NO GROWTH 5 DAYS       CULTURE, URINE [[id number]]  (Abnormal)  (Susceptibility) Collected: 12/14/20 1352    Order Status: Completed Specimen: Urine from Clean catch Updated: 12/18/20 0648     Special Requests: NO SPECIAL REQUESTS        Culture result:       50,000-100,000 COLONIES/mL ENTEROCOCCUS FAECALIS GROUP D                  <1,000 CFU/ML NORMAL SKIN ALONSO ISOLATED                Current Meds:  Current Facility-Administered Medications   Medication Dose Route Frequency    vancomycin (VANCOCIN) 750 mg in 0.9% sodium chloride 250 mL (VIAL-MATE)  750 mg IntraVENous Q8H    0.9% sodium chloride infusion 250 mL  250 mL IntraVENous PRN    NUTRITIONAL SUPPORT ELECTROLYTE PRN ORDERS   Does Not Apply PRN    Saccharomyces boulardii (FLORASTOR) capsule 500 mg  500 mg Oral BID    ciprofloxacin (CIPRO) 400 mg in D5W IVPB (premix)  400 mg IntraVENous Q12H    metoprolol (LOPRESSOR) injection 5 mg  5 mg IntraVENous Q6H PRN    metoprolol tartrate (LOPRESSOR) tablet 12.5 mg  12.5 mg Oral Q12H    enoxaparin (LOVENOX) injection 30 mg  30 mg SubCUTAneous Q12H    dexAMETHasone (DECADRON) tablet 6 mg  6 mg Oral DAILY    cholecalciferol (VITAMIN D3) (1000 Units /25 mcg) tablet 2 Tab  2,000 Units Oral DAILY    sodium chloride (NS) flush 5-40 mL  5-40 mL IntraVENous Q8H    sodium chloride (NS) flush 5-40 mL  5-40 mL IntraVENous PRN    acetaminophen (TYLENOL) tablet 650 mg  650 mg Oral Q6H PRN    Or    acetaminophen (TYLENOL) suppository 650 mg  650 mg Rectal Q6H PRN    polyethylene glycol (MIRALAX) packet 17 g  17 g Oral DAILY PRN    promethazine (PHENERGAN) tablet 12.5 mg  12.5 mg Oral Q6H PRN    Or    ondansetron (ZOFRAN) injection 4 mg  4 mg IntraVENous Q6H PRN    divalproex (DEPAKOTE SPRINKLE) capsule 250 mg  250 mg Oral TID       Other Studies (last 24 hours):  No results found.     Assessment and Plan:     Hospital Problems as of 12/20/2020 Date Reviewed: 9/24/2019          Codes Class Noted - Resolved POA    Hypokalemia ICD-10-CM: E87.6  ICD-9-CM: 276.8  12/17/2020 - Present Unknown        COVID-19 ICD-10-CM: U07.1  ICD-9-CM: 079.89  12/15/2020 - Present Yes        Urinary tract infection ICD-10-CM: N39.0  ICD-9-CM: 599.0  12/14/2020 - Present Unknown        Seizure disorder ([facility] ICD-10-CM: G40.909  ICD-9-CM: 345.90  12/14/2020 - Present Unknown        Dementia ([facility] ICD-10-CM: F03.90  ICD-9-CM: 294.20  12/14/2020 - Present Unknown        Exposure to COVID-19 virus ICD-10-CM: Z20.828  ICD-9-CM: V01.79  12/14/2020 - Present Unknown        * (Principal) Encephalopathy, metabolic SQF-22-IN: U63.52  ICD-9-CM: 348.31  12/14/2020 - Present Unknown              PLAN:    · Acute metabolic encephalopathy likely secondary to UTI and possibly COVID-19 infection and pneumonia  we will continue IV antibiotics- review of her current cultures reveals Enterococcus sensitive to vancomycin, amoxicillin and penicillin and nitrofurantoin. Unfortunately the patient does not take p.o. and she is allergic to amoxicillin so we will consult the pharmacy about vancomycin. · She previously did not qualify for plasma as she was not in respiratory distress or hypoxic. He was found to be in respiratory distress and dyspneic. She was also given convalescent plasma. Respiratory distress has resolved. She was placed on oxygen for comfort but did not desat. · Dementia  continue supportive care. She is able to have simple conversations intermittently at baseline per reports from family. At this time she does not volunteer much conversation we will continue to treat her underlying conditions and monitor  · Seizure disorder continue the Depakote  · Speech has held her diet . I have attempted to feed her myself but the bed does not work in order to elevate her head and chest. She cannot be given a trial of feeding in the supine position as that increases her risk of aspiration. ·   DC planning/Dispo: Home with family when medically stable. At this time they provide the care that she needs and do not want her placed in a nursing facility. They also do not want home health services.     DVT ppx: Patrick    Signed:  [place] MD

## 2020-12-20 NOTE — PROGRESS NOTES
Pt remains nonverbal throughout the shift. Pt opens eyes her eyes when noise is heard in the room. Bed changed out so that her head is able to be elevated. Pt repositioned in the bed. Safety measures in place. Preparing to give report to oncoming shift.

## 2020-12-20 NOTE — PROGRESS NOTES
Pharmacokinetic Consult to Pharmacist    Farooq Claude is a 65 y.o. female being treated for enterococcus UTI with vancomycin. Height: 5' 5\" (165.1 cm)  Weight: 61.5 kg (135 lb 8 oz)  Lab Results   Component Value Date/Time    BUN 17 12/19/2020 08:33 AM    Creatinine 0.33 (L) 12/19/2020 08:33 AM    WBC 7.6 12/19/2020 08:33 AM    Procalcitonin <0.05 12/14/2020 12:35 PM    Lactic acid 1.7 12/14/2020 12:14 PM      Estimated Creatinine Clearance: 65.4 mL/min (A) (by C-G formula based on SCr of 0.33 mg/dL (L)). Lab Results   Component Value Date/Time    Vancomycin,trough 8.9 12/20/2020 05:29 AM       Day 3 of vancomycin. Goal trough is 10-20. Change to 750 mg q8h with next dose at 1400  Will continue to follow patient.       Thank you,  Ga Butcher, PharmD, [address] [place] [address]  Clinical Pharmacist  [phone]

## 2020-12-20 NOTE — PROGRESS NOTES
Pt laying In bed ,she opens her eyes to any noise in the room. Pt still not making any verbal intent. Purewick in place. Pt is on 2L Nasal Cannula. Pt does not appear to be having any shortness of breath or pain at this time. Will continue to monitor.

## 2020-12-21 LAB — VANCOMYCIN TROUGH SERPL-MCNC: 12.7 UG/ML (ref 5–20)

## 2020-12-21 PROCEDURE — 74011250636 HC RX REV CODE- 250/636: Performed by: INTERNAL MEDICINE

## 2020-12-21 PROCEDURE — 65270000029 HC RM PRIVATE

## 2020-12-21 PROCEDURE — 80202 ASSAY OF VANCOMYCIN: CPT

## 2020-12-21 PROCEDURE — 92526 ORAL FUNCTION THERAPY: CPT

## 2020-12-21 PROCEDURE — 36415 COLL VENOUS BLD VENIPUNCTURE: CPT

## 2020-12-21 PROCEDURE — 77030038269 HC DRN EXT URIN PURWCK BARD -A

## 2020-12-21 PROCEDURE — 97530 THERAPEUTIC ACTIVITIES: CPT

## 2020-12-21 PROCEDURE — 74011250637 HC RX REV CODE- 250/637: Performed by: INTERNAL MEDICINE

## 2020-12-21 PROCEDURE — 2709999900 HC NON-CHARGEABLE SUPPLY

## 2020-12-21 PROCEDURE — 74011250637 HC RX REV CODE- 250/637: Performed by: HOSPITALIST

## 2020-12-21 RX ORDER — POTASSIUM CHLORIDE 14.9 MG/ML
20 INJECTION INTRAVENOUS
Status: COMPLETED | OUTPATIENT
Start: 2020-12-21 | End: 2020-12-21

## 2020-12-21 RX ADMIN — VANCOMYCIN HYDROCHLORIDE 750 MG: 750 INJECTION, POWDER, LYOPHILIZED, FOR SOLUTION INTRAVENOUS at 14:00

## 2020-12-21 RX ADMIN — CIPROFLOXACIN 400 MG: 2 INJECTION, SOLUTION INTRAVENOUS at 20:17

## 2020-12-21 RX ADMIN — RDII 250 MG CAPSULE 500 MG: at 18:00

## 2020-12-21 RX ADMIN — DIVALPROEX SODIUM 250 MG: 125 CAPSULE ORAL at 09:12

## 2020-12-21 RX ADMIN — POTASSIUM CHLORIDE 20 MEQ: 14.9 INJECTION, SOLUTION INTRAVENOUS at 10:45

## 2020-12-21 RX ADMIN — DIVALPROEX SODIUM 250 MG: 125 CAPSULE ORAL at 16:56

## 2020-12-21 RX ADMIN — VANCOMYCIN HYDROCHLORIDE 750 MG: 750 INJECTION, POWDER, LYOPHILIZED, FOR SOLUTION INTRAVENOUS at 06:00

## 2020-12-21 RX ADMIN — Medication 10 ML: at 20:16

## 2020-12-21 RX ADMIN — ENOXAPARIN SODIUM 30 MG: 30 INJECTION SUBCUTANEOUS at 20:15

## 2020-12-21 RX ADMIN — METOPROLOL TARTRATE 12.5 MG: 25 TABLET, FILM COATED ORAL at 20:16

## 2020-12-21 RX ADMIN — Medication 10 ML: at 00:23

## 2020-12-21 RX ADMIN — Medication 10 ML: at 13:38

## 2020-12-21 RX ADMIN — CIPROFLOXACIN 400 MG: 2 INJECTION, SOLUTION INTRAVENOUS at 09:14

## 2020-12-21 RX ADMIN — VITAMIN D, TAB 1000IU (100/BT) 2 TABLET: 25 TAB at 09:12

## 2020-12-21 RX ADMIN — POTASSIUM CHLORIDE 20 MEQ: 14.9 INJECTION, SOLUTION INTRAVENOUS at 13:37

## 2020-12-21 RX ADMIN — RDII 250 MG CAPSULE 500 MG: at 09:10

## 2020-12-21 RX ADMIN — METOPROLOL TARTRATE 12.5 MG: 25 TABLET, FILM COATED ORAL at 09:10

## 2020-12-21 RX ADMIN — POTASSIUM CHLORIDE 20 MEQ: 14.9 INJECTION, SOLUTION INTRAVENOUS at 09:13

## 2020-12-21 RX ADMIN — VANCOMYCIN HYDROCHLORIDE 750 MG: 750 INJECTION, POWDER, LYOPHILIZED, FOR SOLUTION INTRAVENOUS at 23:26

## 2020-12-21 RX ADMIN — ENOXAPARIN SODIUM 30 MG: 30 INJECTION SUBCUTANEOUS at 09:14

## 2020-12-21 RX ADMIN — DEXAMETHASONE 6 MG: 4 TABLET ORAL at 09:10

## 2020-12-21 RX ADMIN — DIVALPROEX SODIUM 250 MG: 125 CAPSULE ORAL at 20:16

## 2020-12-21 RX ADMIN — Medication 10 ML: at 05:01

## 2020-12-21 NOTE — PROGRESS NOTES
Comprehensive Nutrition Assessment    Type and Reason for Visit: Reassess, NPO/clear liquid    Nutrition Recommendations/Plan:    Continue diet per SLP. If unable to initiate diet recommend pursue NGFT for primary needs if appropriate with goals of care. If TF pursued consult RD for TF management. Malnutrition Assessment:  Malnutrition Status: Insufficient data(potential 20% loss if FP office wt is accurate, NPO)    Nutrition Assessment:   Nutrition History: From home with son, decreased po intake times 3 days PTA reported. Nutrition Background: medical history significant for dementia, seizures. Admitted with acute metabolic encephalopathy likely secondary to UTI and possibly Covid 19 infection and PNA. Daily Update:  RN reports pt took med today orally, plan to keep NPO other than meds at present. She indicates MD to discuss with family goals of care re: potential TF. Pt remains confused and not following commands per RN therefore presenting potential challenges with NGFT placement. K 3.1 received 20 meq KCl x 3 IV today. Nutrition Related Findings:   NPO per SLP eval 12/17 with no purposeful acceptance of trials noted, SLP eval 12/21 NPO expcept crushed meds. NFPE deferred due to isolation. Current Nutrition Therapies:  DIET NPO Except Meds, With Ice Chips    Current Intake:   Average Meal Intake: NPO        Anthropometric Measures:  Height: 5' 5\" (165.1 cm)  Current Body Wt: 61.5 kg (135 lb 9.3 oz)(12/18), Weight source: Bed scale  BMI: 22.6, Normal weight (BMI 22.0-24.9) age over 72  Admission Body Weight: 143 lb 15.4 oz(no source)  Ideal Body Wt: 125 lbs (57 kg), 115.2 %  Usual Body Wt: 77.1 kg (169 lb 15.6 oz)(per EMR FP office visit 1/31/2020), Percent weight change: -15.3based on admission weight, potential current bed weight. Unable to validate if FP office visit weight is accurate.             Estimated Daily Nutrient Needs:  Energy (kcal/day): 8178-4575 (Kcal/kg(25-30), Weight Used: Current(61.5 kg))  Protein (g/day): 62-74 (1-1.2 g/kg) Weight Used: (Current)  Fluid (ml/day):   (1 ml/kcal)    Nutrition Diagnosis:   · Inadequate oral intake related to swallowing difficulty as evidenced by (NPO day # 4, minimal intake w prior puree diet)    Nutrition Interventions:   Food and/or Nutrient Delivery: Continue NPO     Coordination of Nutrition Care: Continue to monitor while inpatient  Plan of Care discussed with Jonathan Joya RN. Goals: Previous Goal Met: Progressing toward goal(s)  Active Goal: Tolerate initiation of nutrition regimen within 5 days    Nutrition Monitoring and Evaluation:      Food/Nutrient Intake Outcomes: Diet advancement/tolerance  Physical Signs/Symptoms Outcomes: Biochemical data, Chewing or swallowing    Discharge Planning:     Too soon to determine    Giorgio Rosas RD, LDN on 12/21/2020 at 3:55 PM  Contact: [phone]     Disaster Mode active

## 2020-12-21 NOTE — PROGRESS NOTES
Chart screened by  for discharge planning. Patient has become nonverbal and is not eating much at this time. Patient family still wishes to take her home with no services. CM will continue to follow patient during hospitalization for discharge planning and needs. Please consult or notify  if any new issues arise.

## 2020-12-21 NOTE — PROGRESS NOTES
Reviewed notes for spiritual concerns  Did not visit due to precautions  Will continue to assess needs thru this admission    Non verbal per notes

## 2020-12-21 NOTE — PROGRESS NOTES
Hourly rounds completed throughout this shift. Pt hardly said anything to staff this shift. Nonverbal. Pt resting in bed; denies needs at this time. Will continue to monitor and report to oncoming night shift nurse.

## 2020-12-21 NOTE — PROGRESS NOTES
Hospitalist Progress Note     Admit Date:  2020  1:24 PM   Name:  Jeannette Kingston   Age:  65 y.o.  :  1950   MRN:  [id number]   PCP:  Kentrlel Aguila PA-C  Treatment Team: Attending Provider: Betzaida Chou MD; Utilization Review: Leopoldo Gable, RN; Care Manager: Ena Clifford RN; Speech Language Pathologist: Caridad Baeza, HARSHAL    Subjective:   As per prior documentation:    Janeen Cadena is a 65 y.o. female with medical history significant for dementia, seizures who was brought in by her son due to decreased mental status labored breathing and decreased p.o. intake. Patient is somnolent and is unable to answer any questions. Her son who is at bedside provided the medical history. Patient normally is ANO x1-2 and able to engage in simple conversation. She is able to walk with assistance. However, in the past 3 days patient has become less active and lethargic with decreased p.o. intake. Per son, this is how she normally gets when she gets UTI. Patient was started on ciprofloxacin when she got 2 doses so far. Patient condition worsened and she has not been able to get up or speak much in the past 2 days. He also noticed that patient was tachypneic and labored breathing over the weekend which got worse last night. Of note patient has a family member who tested positive for Covid last Monday. She does not have any known fevers, chills, nausea, vomiting, abdominal pain or diarrhea.     In the ED, patient is afebrile and hemodynamically stable with saturation above 94% on room air. Blood work work-up is unrevealing. Urine analysis shows cloudy urine with large leukocyte esterase and more than 100 WBC. CXR with minimal infiltrate at lung bases, right greater than left. Procalc of < 0.05. CT head without any acute intracranial abnormalities.     ROS unable to be obtained due to patient's clinical status. \"       December 15, 2020patient seen and evaluated.   Resting comfortably and not very interactive or cooperative. Hard of hearing but eventually smiles for me after repeatedly being asked. She is unable to give a history secondary to her underlying dementia. She did test positive for COVID-19. December 16, 2020patient seen and evaluated. Resting comfortably. Not very interactive or cooperative today. She does track me in the room. Again unable to give a history secondary to her underlying dementia per family this is her baseline and the patient has probably not walked for several weeks. She does get this way when she has a urinary tract infection. It appears that she has had at least 3 of them this year. Per her son he has been blending food for her and letting her suck it up in a straw. She barely responds to them and does not like healthcare facilities. December 18, 2020patient seen and evaluated. She is resting again not very interactive or cooperative but does track me in the room. Speech has kept her n.p.o.    December 19, 2020patient seen and evaluated. She awakens and smiles initially and then goes back to be unresponsive. Unable to attempt to feed her on my own as her bed does not work and I need to get her up into a sitting position to ensure no aspiration. I have asked nursing to look into getting her bed changed. December 21, 2020patient seen and evaluated. Currently in a bed and now elevates at the top. She easily awakens and holds onto my hand today. She takes applesauce and almost a full cup of apple juice via straw. Speech is still recommending n.p.o. status for her secondary to aspiration. Will discuss with her family.       Objective:     Patient Vitals for the past 24 hrs:   Temp Pulse Resp BP SpO2   12/21/20 0657 98.8 °F (37.1 °C) 79 18 116/64 99 %   12/21/20 0400 97.4 °F (36.3 °C) 80 18 113/70 95 %   12/21/20 0030 98 °F (36.7 °C) 98 18 136/72 96 %   12/20/20 1927 97.7 °F (36.5 °C) 95 18 123/73 96 %   12/20/20 1709 100.4 °F (38 °C) 85 18 (!) 149/81 98 %   12/20/20 1115 99.1 °F (37.3 °C) 84 19 133/76 97 %   12/20/20 0747 99.3 °F (37.4 °C) 83 18 136/73 96 %     Oxygen Therapy  O2 Sat (%): 99 % (12/21/20 0657)  Pulse via Oximetry: 67 beats per minute (12/15/20 0211)  O2 Device: Nasal cannula (12/20/20 0900)  O2 Flow Rate (L/min): 2 l/min (12/20/20 0900)    Intake/Output Summary (Last 24 hours) at 12/21/2020 0731  Last data filed at 12/21/2020 0400  Gross per 24 hour   Intake    Output 850 ml   Net -850 ml         General:    Thin frail and resting but easily arousable alert. CV:   RRR. No murmur, rub, or gallop. Lungs:   Air entry equal bilaterally with no wheezing, rhonchi, or rales. Abdomen:   Soft, nontender, nondistended. Extremities: Warm and dry. No cyanosis or edema. Skin:     No rashes or jaundice. Data Review:  I have reviewed all labs, meds, telemetry events, and studies from the last 24 hours.     Recent Results (from the past 24 hour(s))   PLEASE READ & DOCUMENT PPD TEST IN 48 HRS    Collection Time: 12/20/20 10:43 AM   Result Value Ref Range    PPD Negative Negative    mm Induration 0 0 - 5 mm        All Micro Results     Procedure Component Value Units Date/Time    CULTURE, BLOOD [[id number]] Collected: 12/15/20 0654    Order Status: Completed Specimen: Blood Updated: 12/20/20 0640     Special Requests: --        LEFT  HAND       Culture result: NO GROWTH 5 DAYS       CULTURE, BLOOD [[id number]] Collected: 12/14/20 1235    Order Status: Completed Specimen: Blood Updated: 12/19/20 0837     Special Requests: --        LEFT  HAND       Culture result: NO GROWTH 5 DAYS       CULTURE, URINE [[id number]]  (Abnormal)  (Susceptibility) Collected: 12/14/20 1352    Order Status: Completed Specimen: Urine from Clean catch Updated: 12/18/20 0648     Special Requests: NO SPECIAL REQUESTS        Culture result:       50,000-100,000 COLONIES/mL ENTEROCOCCUS FAECALIS GROUP D                  <1,000 CFU/ML NORMAL SKIN ALONSO ISOLATED                Current Meds:  Current Facility-Administered Medications   Medication Dose Route Frequency    potassium chloride 20 mEq in 100 ml IVPB  20 mEq IntraVENous Q2H    vancomycin (VANCOCIN) 750 mg in 0.9% sodium chloride 250 mL (VIAL-MATE)  750 mg IntraVENous Q8H    0.9% sodium chloride infusion 250 mL  250 mL IntraVENous PRN    NUTRITIONAL SUPPORT ELECTROLYTE PRN ORDERS   Does Not Apply PRN    Saccharomyces boulardii (FLORASTOR) capsule 500 mg  500 mg Oral BID    ciprofloxacin (CIPRO) 400 mg in D5W IVPB (premix)  400 mg IntraVENous Q12H    metoprolol (LOPRESSOR) injection 5 mg  5 mg IntraVENous Q6H PRN    metoprolol tartrate (LOPRESSOR) tablet 12.5 mg  12.5 mg Oral Q12H    enoxaparin (LOVENOX) injection 30 mg  30 mg SubCUTAneous Q12H    dexAMETHasone (DECADRON) tablet 6 mg  6 mg Oral DAILY    cholecalciferol (VITAMIN D3) (1000 Units /25 mcg) tablet 2 Tab  2,000 Units Oral DAILY    sodium chloride (NS) flush 5-40 mL  5-40 mL IntraVENous Q8H    sodium chloride (NS) flush 5-40 mL  5-40 mL IntraVENous PRN    acetaminophen (TYLENOL) tablet 650 mg  650 mg Oral Q6H PRN    Or    acetaminophen (TYLENOL) suppository 650 mg  650 mg Rectal Q6H PRN    polyethylene glycol (MIRALAX) packet 17 g  17 g Oral DAILY PRN    promethazine (PHENERGAN) tablet 12.5 mg  12.5 mg Oral Q6H PRN    Or    ondansetron (ZOFRAN) injection 4 mg  4 mg IntraVENous Q6H PRN    divalproex (DEPAKOTE SPRINKLE) capsule 250 mg  250 mg Oral TID       Other Studies (last 24 hours):  No results found.     Assessment and Plan:     Hospital Problems as of 12/21/2020 Date Reviewed: 9/24/2019          Codes Class Noted - Resolved POA    Hypokalemia ICD-10-CM: E87.6  ICD-9-CM: 276.8  12/17/2020 - Present Unknown        COVID-19 ICD-10-CM: U07.1  ICD-9-CM: 079.89  12/15/2020 - Present Yes        Urinary tract infection ICD-10-CM: N39.0  ICD-9-CM: 599.0  12/14/2020 - Present Unknown        Seizure disorder ([facility] ICD-10-CM: G40.909  ICD-9-CM: 345.90  12/14/2020 - Present Unknown        Dementia ([facility] ICD-10-CM: F03.90  ICD-9-CM: 294.20  12/14/2020 - Present Unknown        Exposure to COVID-19 virus ICD-10-CM: Z20.828  ICD-9-CM: V01.79  12/14/2020 - Present Unknown        * (Principal) Encephalopathy, metabolic [facility]: J93.31  ICD-9-CM: 348.31  12/14/2020 - Present Unknown              PLAN:    · Acute metabolic encephalopathy likely secondary to UTI and possibly COVID-19 infection and pneumonia  we will continue IV antibiotics- review of her current cultures reveals Enterococcus sensitive to vancomycin, amoxicillin and penicillin and nitrofurantoin. Unfortunately the patient does not take p.o. and she is allergic to amoxicillin pharmacy is dosing vancomycin   · she previously did not qualify for plasma as she was not in respiratory distress or hypoxic. He was found to be in respiratory distress and dyspneic. She was also given convalescent plasma. Respiratory distress has resolved. She was placed on oxygen for comfort but did not desat. Today she does sound raspy with some upper airway sounds and will need to ask respiratory to suction her  · Dementia  continue supportive care. She is able to have simple conversations intermittently at baseline per reports from family. At this time she does not volunteer much conversation we will continue to treat her underlying conditions and monitor  · Seizure disorder continue the Depakote  · Speech has held her diet . She did take applesauce and apple juice for me today as above. As there is a risk of aspiration per speech will talk to her family if goals of care with her including placement of a feeding tube. ·   DC planning/Dispo: Home with family when medically stable. At this time they provide the care that she needs and do not want her placed in a nursing facility. They also do not want home health services.     DVT ppx: Lovenox    Signed:  Geovany Knox MD

## 2020-12-21 NOTE — PROGRESS NOTES
#20g 1.75in PIV inserted into RFAusing aseptic technique. Pt tolerated well. Slight site trauma noted at insertion. IV flushes well and has great blood return. #22g 1in PIV inserted into RFA using aseptic technique. Pt tolerated well.        Otf Meredith RN VAT

## 2020-12-21 NOTE — PROGRESS NOTES
ACUTE PHYSICAL THERAPY GOALS:  (Developed with and agreed upon by patient and/or caregiver.)  (1.) Emiliano Khan will move from supine to sit and sit to supine , scoot up and down and roll side to side with CONTACT GUARD ASSIST within 7 treatment day(s). (2.) Emiliano Khan will transfer from bed to chair and chair to bed with CONTACT GUARD ASSIST using the least restrictive device within 7 treatment day(s). (3.) Emiliano Khan will perform seated static and dynamic balance activities x 20 minutes with STAND BY ASSIST to improve safety within 7 treatment day(s). (4.) Emiliano Khan will perform standing static and dynamic balance activities x 10 minutes with MINIMAL ASSIST to improve safety within 7 treatment day(s). PHYSICAL THERAPY: Daily Note and AM Treatment Day # 3    Emiliano Khan is a 65 y.o. female   PRIMARY DIAGNOSIS: Encephalopathy, metabolic  Urinary tract infection [N39.0]  Altered mental status [R41.82]         ASSESSMENT:     REHAB RECOMMENDATIONS: CURRENT LEVEL OF FUNCTION:  (Most Recently Demonstrated)   Recommendation to date pending progress:  Setting:   Short-term Rehab  Equipment:    To Be Determined Bed Mobility:   x 2 max-total assist  Sit to Stand:   Not tested  Transfers:   Not tested  Gait/Mobility:   Not tested     ASSESSMENT:  Ms. Radha Tse is progressing slowly towards PT goals. Patient was nonverbal throughout treatment today with decreased/delayed command following. Patient continues to be max-total assist x 2 for bed mobility. Will continue PT efforts.        SUBJECTIVE:   Ms. Radha Tse states, \"nonverbal.\"    SOCIAL HISTORY/ LIVING ENVIRONMENT:      OBJECTIVE:     PAIN: VITAL SIGNS: LINES/DRAINS:   Pre Treatment: Pain Screen  Pain Scale 1: FLACC  Pain Intensity 1: 0  Post Treatment: None   None  O2 Device: Room air     MOBILITY: I Mod I S SBA CGA Min Mod Max Total  NT x2 Comments:   Bed Mobility    Rolling [] [] [] [] [] [] [] [] [] [] []    Supine to Sit [] [] [] [] [] [] [] [x] [x] [] [x]    Scooting [] [] [] [] [] [] [] [] [] [] []    Sit to Supine [] [] [] [] [] [] [] [x] [x] [] [x]    Transfers    Sit to Stand [] [] [] [] [] [] [] [] [] [x] []    Bed to Chair [] [] [] [] [] [] [] [] [] [x] []    Stand to Sit [] [] [] [] [] [] [] [] [] [x] []    I=Independent, Mod I=Modified Independent, S=Supervision, SBA=Standby Assistance, CGA=Contact Guard Assistance,   Min=Minimal Assistance, Mod=Moderate Assistance, Max=Maximal Assistance, Total=Total Assistance, NT=Not Tested    GAIT: I Mod I S SBA CGA Min Mod Max Total  NT x2 Comments:   Level of Assistance [] [] [] [] [] [] [] [] [] [] []    Distance     DME N/A    Gait Quality     I=Independent, Mod I=Modified Independent, S=Supervision, SBA=Standby Assistance, CGA=Contact Guard Assistance,   Min=Minimal Assistance, Mod=Moderate Assistance, Max=Maximal Assistance, Total=Total Assistance, NT=Not Tested    PLAN:   FREQUENCY/DURATION: PT Plan of Care: 3 times/week(trial basis) for duration of hospital stay or until stated goals are met, whichever comes first.  TREATMENT:     TREATMENT:   ($$ Therapeutic Activity: 23-37 mins    )  Therapeutic Activity (23 Minutes): Therapeutic activity included Supine to Sit, Sit to Supine, Scooting and Sitting balance  to improve functional Mobility, Strength and Activity tolerance.     AFTER TREATMENT POSITION/PRECAUTIONS:  Bed, Needs within reach and RN notified    INTERDISCIPLINARY COLLABORATION:  RN/PCT    TOTAL TREATMENT DURATION:  PT Patient Time In/Time Out  Time In: 0900  Time Out: [address] Kuldeep Card, JAMMIE

## 2020-12-21 NOTE — PROGRESS NOTES
LTG: Patient will tolerate least restrictive diet without overt signs or symptoms of airway compromise. STG: Patient will tolerate pureed diet and thin liquids without overt signs or symptoms of airway compromise. Discontinued 12/16/2020  STG: patient will participate in ongoing po trials in efforts to advance diet. STG: Patient will participate in modified barium swallow study as clinically indicated. SPEECH LANGUAGE PATHOLOGY: DYSPHAGIA- Daily Note 3    NAME/AGE/GENDER: Randy Rubin is a 65 y.o. female  DATE: 12/21/2020  PRIMARY DIAGNOSIS: Urinary tract infection [N39.0]  Altered mental status [R41.82]      ICD-10: Treatment Diagnosis: R13.12 Dysphagia, Oropharyngeal Phase    RECOMMENDATIONS   DIET:    NPO except crushed meds    MEDICATIONS: Crushed in puree      ASPIRATION PRECAUTIONS  · Slow rate of intake  · Small bites/sips  · Upright at 90 degrees during meal     COMPENSATORY STRATEGIES/MODIFICATIONS  · n/a     RECOMMENDATIONS for CONTINUED SPEECH THERAPY:   YES: Anticipate need for ongoing speech therapy during this hospitalization and at next level of care. ASSESSMENT   Patient continues to demonstrate s/sx of oropharyngeal dysphagia. No overt s/sx airway compromise with thin liquids, but concerns due to labored breathing immediately after trials. Mentation and alertness continues to be barrier to acceptance, tolerance, and ability to safely initiate diet. Recommend NPO. crush meds in puree when awake/alert. Will follow up for ongoing trials in efforts to initiate oral diet. May need to consider short term alternate means if in line with goals of care. If alternate means is not in line with family wishes, safest oral diet would be puree/thin liquids.        CONTINUATION OF SKILLED SERVICES/MEDICAL NECESSITY:   Patient is expected to demonstrate progress in  swallow strength, swallow timeliness, swallow function, diet tolerance and swallow safety in order to  improve swallow safety, work toward diet advancement and decrease aspiration risk.  Patient continues to require skilled intervention due to dysphagia. EDUCATION:  · Recommendations discussed with Patient and RN  REHABILITATION POTENTIAL FOR STATED GOALS: Fair    PLAN    FREQUENCY/DURATION: Continue to follow patient 2 times a week for duration of hospital stay to address above goals. - Recommendations for next treatment session: Next treatment will address potrials    SUBJECTIVE   Awake, eyes open. No command following and no verbalizations. Did vocalize with weak/low quality on a few occasions. RN reports attempted oral meds this AM but patient would not swallow. Oxygen Device: nasal cannula   Pain: Pain Scale 1: FLACC  Pain Intensity 1: 0    History of Present Injury/Illness: Ms. [facility]  has a past medical history of Neurological disorder. . She also  has a past surgical history that includes hx hysterectomy. PRECAUTIONS/ALLERGIES: Amoxicillin     Problem List:  (Impairments causing functional limitations):  1. Oral dysphagia    Orientation:  did not respond; non verbal       OBJECTIVE   Swallow treatment-po trials  Presented with ice chips, thin liquids via tsp and straw, and puree. Change in breathing/labored breathing immediately after all thin liquid trials. Occasional grimacing. No cough/throat clear. Unable to assess vocal quality. Improved oral acceptance for multiple trials of puree and thin liquids via tsp with no significant oral holding and adequate clearance; However, then eyes closed requiring significant cueing to open mouth/accept bolus then demonstrated prolonged holding despite cueing. Initially unable to drink via straw, however with tactile cueing, patient consumed small sips via straw. No overt s/sx but again labored breathing immediately and double swallow versus piecemeal swallow upon palpation. Concerns for pharyngeal dysphagia.           Tool Used: Dysphagia Outcome and Severity Scale (JOANNA)    Score Comments   Normal Diet  [] 7 With no strategies or extra time needed   Functional Swallow  [] 6 May have mild oral or pharyngeal delay   Mild Dysphagia  [] 5 Which may require one diet consistency restricted    Mild-Moderate Dysphagia  [] 4 With 1-2 diet consistencies restricted   Moderate Dysphagia  [] 3 With 2 or more diet consistencies restricted   Moderate-Severe Dysphagia  [] 2 With partial PO strategies (trials with ST only)   Severe Dysphagia  [] 1 With inability to tolerate any PO safely      Score:  Initial:3 Most Recent: 2 (Date 12/21/20 )   Interpretation of Tool: The Dysphagia Outcome and Severity Scale (JOANNA) is a simple, easy-to-use, 7-point scale developed to systematically rate the functional severity of dysphagia based on objective assessment and make recommendations for diet level, independence level, and type of nutrition.      INTERDISCIPLINARY COLLABORATION: RN    After treatment position/precautions:  · Upright in bed  · RN notified    Total Treatment Duration:   Time In: 1104  Time Out: Freda [address], [facility], [id number] [facility]

## 2020-12-22 VITALS
BODY MASS INDEX: 22.57 KG/M2 | HEIGHT: 65 IN | DIASTOLIC BLOOD PRESSURE: 78 MMHG | RESPIRATION RATE: 18 BRPM | WEIGHT: 135.5 LBS | TEMPERATURE: 97.9 F | SYSTOLIC BLOOD PRESSURE: 120 MMHG | HEART RATE: 66 BPM | OXYGEN SATURATION: 95 %

## 2020-12-22 LAB
ANION GAP SERPL CALC-SCNC: 7 MMOL/L (ref 7–16)
BASOPHILS # BLD: 0 K/UL (ref 0–0.2)
BASOPHILS NFR BLD: 0 % (ref 0–2)
BUN SERPL-MCNC: 13 MG/DL (ref 8–23)
CALCIUM SERPL-MCNC: 8.3 MG/DL (ref 8.3–10.4)
CHLORIDE SERPL-SCNC: 108 MMOL/L (ref 98–107)
CO2 SERPL-SCNC: 27 MMOL/L (ref 21–32)
CREAT SERPL-MCNC: 0.33 MG/DL (ref 0.6–1)
DIFFERENTIAL METHOD BLD: ABNORMAL
EOSINOPHIL # BLD: 0 K/UL (ref 0–0.8)
EOSINOPHIL NFR BLD: 0 % (ref 0.5–7.8)
ERYTHROCYTE [DISTWIDTH] IN BLOOD BY AUTOMATED COUNT: 13.6 % (ref 11.9–14.6)
GLUCOSE SERPL-MCNC: 97 MG/DL (ref 65–100)
HCT VFR BLD AUTO: 34.2 % (ref 35.8–46.3)
HGB BLD-MCNC: 10.9 G/DL (ref 11.7–15.4)
IMM GRANULOCYTES # BLD AUTO: 0.1 K/UL (ref 0–0.5)
IMM GRANULOCYTES NFR BLD AUTO: 1 % (ref 0–5)
LYMPHOCYTES # BLD: 2.4 K/UL (ref 0.5–4.6)
LYMPHOCYTES NFR BLD: 26 % (ref 13–44)
MCH RBC QN AUTO: 29.5 PG (ref 26.1–32.9)
MCHC RBC AUTO-ENTMCNC: 31.9 G/DL (ref 31.4–35)
MCV RBC AUTO: 92.4 FL (ref 79.6–97.8)
MONOCYTES # BLD: 0.9 K/UL (ref 0.1–1.3)
MONOCYTES NFR BLD: 9 % (ref 4–12)
NEUTS SEG # BLD: 5.9 K/UL (ref 1.7–8.2)
NEUTS SEG NFR BLD: 64 % (ref 43–78)
NRBC # BLD: 0 K/UL (ref 0–0.2)
PLATELET # BLD AUTO: 313 K/UL (ref 150–450)
PMV BLD AUTO: 10.7 FL (ref 9.4–12.3)
POTASSIUM SERPL-SCNC: 3.6 MMOL/L (ref 3.5–5.1)
RBC # BLD AUTO: 3.7 M/UL (ref 4.05–5.2)
SODIUM SERPL-SCNC: 142 MMOL/L (ref 136–145)
WBC # BLD AUTO: 9.3 K/UL (ref 4.3–11.1)

## 2020-12-22 PROCEDURE — 74011250636 HC RX REV CODE- 250/636: Performed by: INTERNAL MEDICINE

## 2020-12-22 PROCEDURE — 74011250637 HC RX REV CODE- 250/637: Performed by: INTERNAL MEDICINE

## 2020-12-22 PROCEDURE — 92526 ORAL FUNCTION THERAPY: CPT

## 2020-12-22 PROCEDURE — 85025 COMPLETE CBC W/AUTO DIFF WBC: CPT

## 2020-12-22 PROCEDURE — 74011250637 HC RX REV CODE- 250/637: Performed by: HOSPITALIST

## 2020-12-22 PROCEDURE — 80048 BASIC METABOLIC PNL TOTAL CA: CPT

## 2020-12-22 RX ORDER — SAME BUTANEDISULFONATE/BETAINE 400-600 MG
500 POWDER IN PACKET (EA) ORAL 2 TIMES DAILY
Qty: 28 CAP | Refills: 0 | Status: SHIPPED | OUTPATIENT
Start: 2020-12-22 | End: 2020-12-29

## 2020-12-22 RX ORDER — METOPROLOL TARTRATE 25 MG/1
12.5 TABLET, FILM COATED ORAL EVERY 12 HOURS
Qty: 30 TAB | Refills: 1 | Status: SHIPPED | OUTPATIENT
Start: 2020-12-22 | End: 2021-05-18 | Stop reason: SDUPTHER

## 2020-12-22 RX ORDER — LEVOFLOXACIN 500 MG/1
500 TABLET, FILM COATED ORAL DAILY
Qty: 2 TAB | Refills: 0 | Status: SHIPPED | OUTPATIENT
Start: 2020-12-22 | End: 2020-12-24

## 2020-12-22 RX ORDER — NITROFURANTOIN 25; 75 MG/1; MG/1
100 CAPSULE ORAL 2 TIMES DAILY
Qty: 4 CAP | Refills: 0 | Status: SHIPPED | OUTPATIENT
Start: 2020-12-22 | End: 2020-12-24

## 2020-12-22 RX ADMIN — RDII 250 MG CAPSULE 500 MG: at 08:50

## 2020-12-22 RX ADMIN — ENOXAPARIN SODIUM 30 MG: 30 INJECTION SUBCUTANEOUS at 08:20

## 2020-12-22 RX ADMIN — METOPROLOL TARTRATE 12.5 MG: 25 TABLET, FILM COATED ORAL at 08:50

## 2020-12-22 RX ADMIN — VANCOMYCIN HYDROCHLORIDE 750 MG: 750 INJECTION, POWDER, LYOPHILIZED, FOR SOLUTION INTRAVENOUS at 05:03

## 2020-12-22 RX ADMIN — Medication 10 ML: at 05:03

## 2020-12-22 RX ADMIN — VANCOMYCIN HYDROCHLORIDE 750 MG: 750 INJECTION, POWDER, LYOPHILIZED, FOR SOLUTION INTRAVENOUS at 14:00

## 2020-12-22 RX ADMIN — DEXAMETHASONE 6 MG: 4 TABLET ORAL at 08:50

## 2020-12-22 RX ADMIN — VITAMIN D, TAB 1000IU (100/BT) 2 TABLET: 25 TAB at 08:50

## 2020-12-22 RX ADMIN — DIVALPROEX SODIUM 250 MG: 125 CAPSULE ORAL at 08:50

## 2020-12-22 RX ADMIN — CIPROFLOXACIN 400 MG: 2 INJECTION, SOLUTION INTRAVENOUS at 08:50

## 2020-12-22 RX ADMIN — Medication 10 ML: at 14:00

## 2020-12-22 NOTE — PROGRESS NOTES
LTG: Patient will tolerate least restrictive diet without overt signs or symptoms of airway compromise. STG: Patient will tolerate pureed diet and thin liquids without overt signs or symptoms of airway compromise. STG: patient will participate in ongoing po trials in efforts to advance diet. STG: Patient will participate in modified barium swallow study as clinically indicated. SPEECH LANGUAGE PATHOLOGY: DYSPHAGIA- Daily Note 4    NAME/AGE/GENDER: Madison Anders is a 65 y.o. female  DATE: 12/22/2020  PRIMARY DIAGNOSIS: Urinary tract infection [N39.0]  Altered mental status [R41.82]      ICD-10: Treatment Diagnosis: R13.12 Dysphagia, Oropharyngeal Phase    RECOMMENDATIONS   DIET:    PO:  Pureed   Liquids:  regular thin except crushed meds    MEDICATIONS: Crushed in puree      ASPIRATION PRECAUTIONS  · Slow rate of intake  · Small bites/sips  · Upright at 90 degrees during meal     COMPENSATORY STRATEGIES/MODIFICATIONS  · Assist with meals     RECOMMENDATIONS for CONTINUED SPEECH THERAPY:   YES: Anticipate need for ongoing speech therapy during this hospitalization and at next level of care. ASSESSMENT   Patient continues to demonstrate s/sx of oropharyngeal dysphagia. No overt s/sx airway compromise with thin liquids via cup and straw and pureed. Mentation and alertness continues to be barrier to acceptance, and tolerances. Recommend pureed/thin with crush meds in puree when awake/alert. Will follow up for ongoing PO trial and diet tolerance. CONTINUATION OF SKILLED SERVICES/MEDICAL NECESSITY:   Patient is expected to demonstrate progress in  swallow strength, swallow timeliness, swallow function, diet tolerance and swallow safety in order to  improve swallow safety, work toward diet advancement and decrease aspiration risk.  Patient continues to require skilled intervention due to dysphagia.    EDUCATION:  · Recommendations discussed with Patient and RN  REHABILITATION POTENTIAL FOR STATED GOALS: Fair    PLAN    FREQUENCY/DURATION: Continue to follow patient 2 times a week for duration of hospital stay to address above goals. - Recommendations for next treatment session: Next treatment will address potrials    SUBJECTIVE   Awake, eyes open. No command following and no verbalizations. Oxygen Device: nasal cannula   Pain: Pain Scale 1: Visual  Pain Intensity 1: 0    History of Present Injury/Illness: Ms. Kristen Fernandez  has a past medical history of Neurological disorder. . She also  has a past surgical history that includes hx hysterectomy. PRECAUTIONS/ALLERGIES: Amoxicillin     Problem List:  (Impairments causing functional limitations):  1. Oral dysphagia    Orientation:  did not respond; non verbal       OBJECTIVE   Swallow treatment-po trials  Presented with thin liquids via cup and straw and puree with no overt signs/sx of aspiration. Unable to assess vocal quality. Pt will need to be assisted with meal. Recommend pureed/thin with medication as tolerated. Will follow. Tool Used: Dysphagia Outcome and Severity Scale (JOANNA)    Score Comments   Normal Diet  [] 7 With no strategies or extra time needed   Functional Swallow  [] 6 May have mild oral or pharyngeal delay   Mild Dysphagia  [] 5 Which may require one diet consistency restricted    Mild-Moderate Dysphagia  [] 4 With 1-2 diet consistencies restricted   Moderate Dysphagia  [] 3 With 2 or more diet consistencies restricted   Moderate-Severe Dysphagia  [] 2 With partial PO strategies (trials with ST only)   Severe Dysphagia  [] 1 With inability to tolerate any PO safely      Score:  Initial:3 Most Recent: 2 (Date 12/22/20 )   Interpretation of Tool: The Dysphagia Outcome and Severity Scale (JOANNA) is a simple, easy-to-use, 7-point scale developed to systematically rate the functional severity of dysphagia based on objective assessment and make recommendations for diet level, independence level, and type of nutrition.      INTERDISCIPLINARY COLLABORATION: RN    After treatment position/precautions:  · Upright in bed  · RN notified    Total Treatment Duration:   Time In: 9071  Time Out: [address] MA/CCC/SLP

## 2020-12-22 NOTE — PROGRESS NOTES
Alert, non verbal.  tolerated meds crushed and in applesauce and sips juice. Kept turned and dry. Hourly rounds completed. Resting in bed. Bed in low locked position. Call light and personal items within reach. Will continue to monitor and give report to oncoming day shift nurse.

## 2020-12-22 NOTE — PROGRESS NOTES
Pharmacokinetic Consult to Pharmacist    Justen Alston is a 65 y.o. female being treated for enterococcus UTI with vancomycin. Height: 5' 5\" (165.1 cm)  Weight: 61.5 kg (135 lb 8 oz)  Lab Results   Component Value Date/Time    BUN 13 12/22/2020 04:09 AM    Creatinine 0.33 (L) 12/22/2020 04:09 AM    WBC 9.3 12/22/2020 04:09 AM    Procalcitonin <0.05 12/14/2020 12:35 PM    Lactic acid 1.7 12/14/2020 12:14 PM      Estimated Creatinine Clearance: 65.4 mL/min (A) (by C-G formula based on SCr of 0.33 mg/dL (L)). Lab Results   Component Value Date/Time    Vancomycin,trough 12.7 12/21/2020 10:29 PM       Day 5 of vancomycin. Goal trough is 10-20. Trough is therapeutic at 12.7. Continue vancomycin 750 mg IV q8h. Further levels will be ordered as clinically indicated. Pharmacy will continue to follow. Please call with any questions.     Thank you,  Yoandy Walker, PharmD, BCPS  Clinical Pharmacist  [phone]

## 2020-12-22 NOTE — PROGRESS NOTES
Care Management Interventions  PCP Verified by CM: Yes  Mode of Transport at Discharge: Self  Transition of Care Consult (CM Consult): Discharge Planning  Discharge Durable Medical Equipment: No  Physical Therapy Consult: No  Occupational Therapy Consult: No  Speech Therapy Consult: Yes  Current Support Network: Other(Patient lives in a camper in [facility])  Confirm Follow Up Transport: Family  The Patient and/or Patient Representative was Provided with a Choice of Provider and Agrees with the Discharge Plan?: No  Freedom of Choice List was Provided with Basic Dialogue that Supports the Patient's Individualized Plan of Care/Goals, Treatment Preferences and Shares the Quality Data Associated with the Providers?: No  [employer] Information Provided?: No  Discharge Location  Discharge Placement: Home    Patient to discharge home today. Patient family wishes for patient to return home they do not wan STR or HH. Patient to transport home with her son. All milestones for discharge have been met.

## 2020-12-22 NOTE — DISCHARGE SUMMARY
Hospitalist Discharge Summary     Admit Date:  2020  1:24 PM   Name:  Arnol Stoddard   Age:  65 y.o.  :  1950   MRN:  [id number]   PCP:  Elisabeth Osler, PA-C  Treatment Team: Attending Provider: Nancy Mckeon MD; Utilization Review: Germania Simon, RN; Care Manager: Kwasi Lemus, RN; Physical Therapist: Amara Tapia, PT, DPT    Problem List for this Hospitalization:  Hospital Problems as of 2020 Date Reviewed: 2019          Codes Class Noted - Resolved POA    Hypokalemia ICD-10-CM: E87.6  ICD-9-CM: 276.8  2020 - Present Unknown        COVID-19 ICD-10-CM: U07.1  ICD-9-CM: 079.89  12/15/2020 - Present Yes        Urinary tract infection ICD-10-CM: N39.0  ICD-9-CM: 599.0  2020 - Present Unknown        Seizure disorder ([facility] [address] ICD-10-CM: G40.909  ICD-9-CM: 345.90  2020 - Present Unknown        Dementia ([facility] ICD-10-CM: F03.90  ICD-9-CM: 294.20  2020 - Present Unknown        Exposure to COVID-19 virus ICD-10-CM: Z20.828  ICD-9-CM: V01.79  2020 - Present Unknown        * (Principal) Encephalopathy, metabolic [facility]-CS: R63.35  ICD-9-CM: 348.31  2020 - Present Unknown                Admission HPI from 2020:    \" Arnol Stoddard is a 65 y.o. female with medical history significant for dementia, seizures who was brought in by her son due to decreased mental status labored breathing and decreased p.o. intake. Patient is somnolent and is unable to answer any questions. Her son who is at bedside provided the medical history. Patient normally is ANO x1-2 and able to engage in simple conversation. She is able to walk with assistance. However, in the past 3 days patient has become less active and lethargic with decreased p.o. intake. Per son, this is how she normally gets when she gets UTI. Patient was started on ciprofloxacin when she got 2 doses so far.   Patient condition worsened and she has not been able to get up or speak much in the past 2 days. He also noticed that patient was tachypneic and labored breathing over the weekend which got worse last night. Of note patient has a family member who tested positive for Covid last Monday. She does not have any known fevers, chills, nausea, vomiting, abdominal pain or diarrhea.     In the ED, patient is afebrile and hemodynamically stable with saturation above 94% on room air. Blood work work-up is unrevealing. Urine analysis shows cloudy urine with large leukocyte esterase and more than 100 WBC. CXR with minimal infiltrate at lung bases, right greater than left. Procalc of < 0.05. CT head without any acute intracranial abnormalities.     ROS unable to be obtained due to patient's clinical status. \"    Hospital Course:  Mrs. Judith Cadena was admitted and continued on antibiotics for UTI. Urine cultures grew out Enterococcus faecalis that was sensitive to penicillin, ampicillin, nitrofurantoin and vancomycin. The patient was continued on vancomycin as she is allergic to amoxicillin. Her COVID-19 testing came back positive and she was started on treatment with remdesivir but did not initially qualify for convalescent plasma. On December 18, 2020 she was noted to be dyspneic and short of breath and respiratory distress. Her son consented for convalescent plasma and this is was given. She was placed on oxygen for comfort but never desatted. Hospital course has been complicated by her baseline dementia and limited interaction with her environment and with people. At baseline she eats very little and drinks through a straw she was seen by speech therapy who recommended. Keeping her n.p.o. and considering other alternatives feeding as a suspected that the patient is aspirating. And her pneumonia with secondary to the COVID-19 as well as aspiration pneumonia.   Therapy recommendations were discussed with the patient's son who states that the patient would not want a feeding tube and would likely pull it out. He is sure once he gets her back to her home environment with family will be able to get her to eat. She is stable for discharge to home. She will complete 2 additional days of p.o. antibiotics. The family has declined any home health services or resources offered by the hospital.  They are capable of taking care of the patient themselves at this time. Follow up instructions below. Plan was discussed with the patient son and the IDT. All questions answered. Patient was stable at time of discharge and was instructed to call or return if there are any concerns or recurrence of symptoms. Diagnostic Imaging/Tests:   Ct Head Wo Cont    Result Date: 12/14/2020  Title:  CT Head without contrast. Clinical History: The Female patient is 65years old  presenting with symptoms of unResponsive. Technique: Thin slice axial CT images through the brain were obtained. All CT scans at this facility are performed using dose reduction/dose modulation techniques, as appropriate the performed exam, including the following: Automated Exposure Control; Adjustment of the mA and/or kV according to patient size (this includes techniques or standardized protocols for targeted exams where dose is matched to indication/reason for exam); and Use of Iterative Reconstruction Technique. Radiation Exposure Indices: Reference Air Kerma (Ka,r) = 567 mGy-cm Comparison:  None. Findings:  Cerebrum: Age-related cortical involutional changes are seen with sulcal and ventricular prominence. There is normal gray-white matter differentiation. No evidence of intracranial hemorrhage, mass, or other space-occupying lesion is seen. There are no abnormal extra-axial fluid collections. Cerebellum: Involutional changes. CSF spaces: The ventricular system is within normal limits. The basilar cisterns are unremarkable. Brainstem: No evidence of ischemia, hemorrhage, or mass.  Extracranial tissues: Visualized orbits and extracranial soft tissues are unremarkable. Paranasal sinuses/Mastoids: Well-pneumatized and aerated. . Calvarium: Incidental hyperostosis frontalis interna. IMPRESSION: 1. No acute intracranial abnormality. CPT code 03896     Xr Chest Port    Result Date: 12/14/2020  Clinical History: The female patient is a 65year old  presenting with symptoms of cough. Comparison:  none Findings:  Frontal view of the chest was obtained. There is minimal platelike atelectasis in the right midlung field with questionable slight infiltrate at the lung bases, right greater than left. No pleural effusions are demonstrated. The cardiomediastinal silhouette is within normal limits. There are no acute osseous abnormalities. Impression:  1. Questionable minimal infiltrate at lung bases, right greater than left with slight platelike atelectasis in the peripheral right midlung field. CPT code(s) 44901       Echocardiogram results:  No results found for this visit on 12/14/20.       All Micro Results     Procedure Component Value Units Date/Time    CULTURE, BLOOD [[id number]] Collected: 12/15/20 0654    Order Status: Completed Specimen: Blood Updated: 12/20/20 0640     Special Requests: --        LEFT  HAND       Culture result: NO GROWTH 5 DAYS       CULTURE, BLOOD [[id number]] Collected: 12/14/20 1235    Order Status: Completed Specimen: Blood Updated: 12/19/20 0837     Special Requests: --        LEFT  HAND       Culture result: NO GROWTH 5 DAYS       CULTURE, URINE [[id number]]  (Abnormal)  (Susceptibility) Collected: 12/14/20 1352    Order Status: Completed Specimen: Urine from Clean catch Updated: 12/18/20 0648     Special Requests: NO SPECIAL REQUESTS        Culture result:       50,000-100,000 COLONIES/mL ENTEROCOCCUS FAECALIS GROUP D                  <1,000 CFU/ML NORMAL SKIN ALONSO ISOLATED                Labs: Results:       BMP, Mg, Phos Recent Labs     12/22/20  0409      K 3.6   *   CO2 27   AGAP 7   BUN 13   CREA 0.33*   CA 8.3   GLU 97      CBC Recent Labs     12/22/20  0409   WBC 9.3   RBC 3.70*   HGB 10.9*   HCT 34.2*      GRANS 64   LYMPH 26   EOS 0*   MONOS 9   BASOS 0   IG 1   ANEU 5.9   ABL 2.4   ELIZABETH 0.0   ABM 0.9   ABB 0.0   AIG 0.1      LFT No results for input(s): ALT, TBIL, AP, TP, ALB, GLOB, AGRAT in the last 72 hours.     No lab exists for component: SGOT, GPT   Cardiac Testing No results found for: BNPP, BNP, CPK, RCK1, RCK2, RCK3, RCK4, CKMB, CKNDX, CKND1, TROPT, TROIQ   Coagulation Tests No results found for: PTP, INR, APTT, INREXT   A1c No results found for: HBA1C, HGBE8, ICK2WRKT   Lipid Panel Lab Results   Component Value Date/Time    Cholesterol, total 213 (H) 08/12/2020 12:10 PM    HDL Cholesterol 47 08/12/2020 12:10 PM    LDL, calculated 147 (H) 08/12/2020 12:10 PM    VLDL, calculated 19 08/12/2020 12:10 PM    Triglyceride 95 08/12/2020 12:10 PM      Thyroid Panel Lab Results   Component Value Date/Time    TSH 4.050 11/23/2020 09:10 AM    TSH 1.890 01/15/2019 03:31 PM        Most Recent UA Lab Results   Component Value Date/Time    Color ELBA 12/14/2020 01:52 PM    Appearance CLOUDY 12/14/2020 01:52 PM    Specific gravity 1.024 (H) 12/14/2020 01:52 PM    pH (UA) 6.0 12/14/2020 01:52 PM    Protein 30 (A) 12/14/2020 01:52 PM    Glucose Negative 12/14/2020 01:52 PM    Ketone 15 (A) 12/14/2020 01:52 PM    Bilirubin SMALL (A) 12/14/2020 01:52 PM    Blood TRACE (A) 12/14/2020 01:52 PM    Urobilinogen 1.0 12/14/2020 01:52 PM    Nitrites Negative 12/14/2020 01:52 PM    Leukocyte Esterase LARGE (A) 12/14/2020 01:52 PM        Allergies   Allergen Reactions    Amoxicillin Rash     Immunization History   Administered Date(s) Administered    Influenza Vaccine (Tri) Adjuvanted (>65 Yrs FLUAD TRI 21024) 01/31/2020    TB Skin Test (PPD) Intradermal 12/18/2020       All Labs from Last 24 Hrs:  Recent Results (from the past 24 hour(s))   deskwolf    Collection Time: 12/21/20 10:29 PM   Result Value Ref Range Vancomycin,trough 12.7 5 - 20 ug/mL   CBC WITH AUTOMATED DIFF    Collection Time: 12/22/20  4:09 AM   Result Value Ref Range    WBC 9.3 4.3 - 11.1 K/uL    RBC 3.70 (L) 4.05 - 5.2 M/uL    HGB 10.9 (L) 11.7 - 15.4 g/dL    HCT 34.2 (L) 35.8 - 46.3 %    MCV 92.4 79.6 - 97.8 FL    MCH 29.5 26.1 - 32.9 PG    MCHC 31.9 31.4 - 35.0 g/dL    RDW 13.6 11.9 - 14.6 %    PLATELET 616 272 - 090 K/uL    MPV 10.7 9.4 - 12.3 FL    ABSOLUTE NRBC 0.00 0.0 - 0.2 K/uL    DF AUTOMATED      NEUTROPHILS 64 43 - 78 %    LYMPHOCYTES 26 13 - 44 %    MONOCYTES 9 4.0 - 12.0 %    EOSINOPHILS 0 (L) 0.5 - 7.8 %    BASOPHILS 0 0.0 - 2.0 %    IMMATURE GRANULOCYTES 1 0.0 - 5.0 %    ABS. NEUTROPHILS 5.9 1.7 - 8.2 K/UL    ABS. LYMPHOCYTES 2.4 0.5 - 4.6 K/UL    ABS. MONOCYTES 0.9 0.1 - 1.3 K/UL    ABS. EOSINOPHILS 0.0 0.0 - 0.8 K/UL    ABS. BASOPHILS 0.0 0.0 - 0.2 K/UL    ABS. IMM.  GRANS. 0.1 0.0 - 0.5 K/UL   METABOLIC PANEL, BASIC    Collection Time: 12/22/20  4:09 AM   Result Value Ref Range    Sodium 142 136 - 145 mmol/L    Potassium 3.6 3.5 - 5.1 mmol/L    Chloride 108 (H) 98 - 107 mmol/L    CO2 27 21 - 32 mmol/L    Anion gap 7 7 - 16 mmol/L    Glucose 97 65 - 100 mg/dL    BUN 13 8 - 23 MG/DL    Creatinine 0.33 (L) 0.6 - 1.0 MG/DL    GFR est AA >60 >60 ml/min/1.73m2    GFR est non-AA >60 >60 ml/min/1.73m2    Calcium 8.3 8.3 - 10.4 MG/DL       Discharge Exam:  Patient Vitals for the past 24 hrs:   Temp Pulse Resp BP SpO2   12/22/20 0643 98 °F (36.7 °C) 71 18 125/73 98 %   12/22/20 0412 98.2 °F (36.8 °C) 63 18 116/64 97 %   12/21/20 2301 97.9 °F (36.6 °C) 73 18 103/64 97 %   12/21/20 2035 98 °F (36.7 °C) 76 17 108/70 93 %   12/21/20 1517 98.1 °F (36.7 °C) 83 18 113/70 95 %   12/21/20 1444     92 %   12/21/20 1121 98 °F (36.7 °C) 90 18 (!) 150/81 100 %     Oxygen Therapy  O2 Sat (%): 98 % (12/22/20 0643)  Pulse via Oximetry: 80 beats per minute (12/21/20 1444)  O2 Device: Room air (12/21/20 1444)  O2 Flow Rate (L/min): 0 l/min (12/21/20 1444)    Intake/Output Summary (Last 24 hours) at 12/22/2020 1023  Last data filed at 12/22/2020 0412  Gross per 24 hour   Intake 100 ml   Output 950 ml   Net -850 ml       General:    Well nourished. Alert. No distress. Eyes:   Normal sclera. Extraocular movements intact. ENT:  Normocephalic, atraumatic. Moist mucous membranes  CV:   Regular rate and rhythm. No murmur, rub, or gallop. Lungs:  Clear to auscultation bilaterally. No wheezing, rhonchi, or rales. Abdomen: Soft, nontender, nondistended. Bowel sounds normal.   Extremities: Warm and dry. No cyanosis or edema. Neurologic: CN II-XII grossly intact. Sensation intact. Skin:     No rashes or jaundice. Psych:  Normal mood and affect. Discharge Info:   Current Discharge Medication List      START taking these medications    Details   Saccharomyces boulardii (FLORASTOR) 250 mg capsule Take 2 Caps by mouth two (2) times a day for 7 days. Qty: 28 Cap, Refills: 0      metoprolol tartrate (LOPRESSOR) 25 mg tablet Take 0.5 Tabs by mouth every twelve (12) hours. For Hypertension  Qty: 30 Tab, Refills: 1      nitrofurantoin, macrocrystal-monohydrate, (MACROBID) 100 mg capsule Take 1 Cap by mouth two (2) times a day for 2 days. Qty: 4 Cap, Refills: 0         CONTINUE these medications which have NOT CHANGED    Details   divalproex (Depakote Sprinkles) 125 mg capsule Take 2 Caps by mouth three (3) times daily. Indications: seizures  Qty: 180 Cap, Refills: 5    Associated Diagnoses: Seizures ([address]      cholecalciferol (VITAMIN D3) (1000 Units /25 mcg) tablet Take  by mouth daily. STOP taking these medications       ciprofloxacin HCl (Cipro) 500 mg tablet Comments:   Reason for Stopping:                 Disposition: Home  Activity: As tolerated  Diet: DIET PUREED; crushed meds in applesauce or pudding.   Thin liquids    ASPIRATION PRECAUTIONS  · Slow rate of intake  · Small bites/sips  · Upright at 90 degrees during meal     Follow-up Appointments   Procedures    FOLLOW UP VISIT Appointment in: One Week     Standing Status:   Standing     Number of Occurrences:   1     Order Specific Question:   Appointment in     Answer: One Week         Follow-up Information     Follow up With Specialties Details Why Contact Info    Mu Carpenter Physician Assistant   [facility] [id number] [phone]            Time spent in patient discharge planning and coordination 35 minutes.     Signed:  Valdez Lock MD

## 2020-12-23 NOTE — PROGRESS NOTES
Pt discharged home. Pt's sister Bethany Calvert came and picked pt up. Hourly rounds completed. PIVs removed with no bleeding present. Discharge paperwork and instructions with prescription scripts handed to pt's sister. Opportunity for questions provided. Pt denies needs at this time. All needs met at this time. Called and spoke with pt's son Lawrence Brooks per MD request and apologized on the miscommunication on pt's discharge timing. Lawrence Brooks was very appreciative of the call and said pt was doing fine, sitting up and watching TV.